# Patient Record
Sex: FEMALE | Race: OTHER | NOT HISPANIC OR LATINO | URBAN - METROPOLITAN AREA
[De-identification: names, ages, dates, MRNs, and addresses within clinical notes are randomized per-mention and may not be internally consistent; named-entity substitution may affect disease eponyms.]

---

## 2022-07-30 ENCOUNTER — INPATIENT (INPATIENT)
Facility: HOSPITAL | Age: 77
LOS: 1 days | Discharge: ANOTHER HOSPITAL | DRG: 282 | End: 2022-08-01
Attending: INTERNAL MEDICINE | Admitting: HOSPITALIST
Payer: MEDICARE

## 2022-07-30 VITALS
SYSTOLIC BLOOD PRESSURE: 196 MMHG | DIASTOLIC BLOOD PRESSURE: 134 MMHG | TEMPERATURE: 99 F | RESPIRATION RATE: 16 BRPM | OXYGEN SATURATION: 97 % | HEART RATE: 156 BPM | WEIGHT: 155.65 LBS

## 2022-07-30 DIAGNOSIS — Z98.890 OTHER SPECIFIED POSTPROCEDURAL STATES: Chronic | ICD-10-CM

## 2022-07-30 DIAGNOSIS — I48.91 UNSPECIFIED ATRIAL FIBRILLATION: ICD-10-CM

## 2022-07-30 DIAGNOSIS — Z90.49 ACQUIRED ABSENCE OF OTHER SPECIFIED PARTS OF DIGESTIVE TRACT: Chronic | ICD-10-CM

## 2022-07-30 DIAGNOSIS — Z90.710 ACQUIRED ABSENCE OF BOTH CERVIX AND UTERUS: Chronic | ICD-10-CM

## 2022-07-30 LAB
ALBUMIN SERPL ELPH-MCNC: 3.4 G/DL — SIGNIFICANT CHANGE UP (ref 3.3–5)
ALP SERPL-CCNC: 84 U/L — SIGNIFICANT CHANGE UP (ref 40–120)
ALT FLD-CCNC: 17 U/L — SIGNIFICANT CHANGE UP (ref 10–45)
ANION GAP SERPL CALC-SCNC: 8 MMOL/L — SIGNIFICANT CHANGE UP (ref 5–17)
APPEARANCE UR: CLEAR — SIGNIFICANT CHANGE UP
APTT BLD: 35.5 SEC — SIGNIFICANT CHANGE UP (ref 27.5–35.5)
APTT BLD: >200 SEC — CRITICAL HIGH (ref 27.5–35.5)
AST SERPL-CCNC: 19 U/L — SIGNIFICANT CHANGE UP (ref 10–40)
BACTERIA # UR AUTO: NEGATIVE /HPF — SIGNIFICANT CHANGE UP
BASOPHILS # BLD AUTO: 0.03 K/UL — SIGNIFICANT CHANGE UP (ref 0–0.2)
BASOPHILS NFR BLD AUTO: 0.4 % — SIGNIFICANT CHANGE UP (ref 0–2)
BILIRUB SERPL-MCNC: 0.3 MG/DL — SIGNIFICANT CHANGE UP (ref 0.2–1.2)
BILIRUB UR-MCNC: NEGATIVE — SIGNIFICANT CHANGE UP
BUN SERPL-MCNC: 22 MG/DL — SIGNIFICANT CHANGE UP (ref 7–23)
CALCIUM SERPL-MCNC: 8.7 MG/DL — SIGNIFICANT CHANGE UP (ref 8.4–10.5)
CHLORIDE SERPL-SCNC: 105 MMOL/L — SIGNIFICANT CHANGE UP (ref 96–108)
CO2 SERPL-SCNC: 25 MMOL/L — SIGNIFICANT CHANGE UP (ref 22–31)
COLOR SPEC: YELLOW — SIGNIFICANT CHANGE UP
CREAT SERPL-MCNC: 0.85 MG/DL — SIGNIFICANT CHANGE UP (ref 0.5–1.3)
DIFF PNL FLD: ABNORMAL
EGFR: 72 ML/MIN/1.73M2 — SIGNIFICANT CHANGE UP
EOSINOPHIL # BLD AUTO: 0.19 K/UL — SIGNIFICANT CHANGE UP (ref 0–0.5)
EOSINOPHIL NFR BLD AUTO: 2.2 % — SIGNIFICANT CHANGE UP (ref 0–6)
EPI CELLS # UR: ABNORMAL
GLUCOSE SERPL-MCNC: 156 MG/DL — HIGH (ref 70–99)
GLUCOSE UR QL: NEGATIVE — SIGNIFICANT CHANGE UP
HCT VFR BLD CALC: 37.5 % — SIGNIFICANT CHANGE UP (ref 34.5–45)
HCT VFR BLD CALC: 39.3 % — SIGNIFICANT CHANGE UP (ref 34.5–45)
HGB BLD-MCNC: 12.3 G/DL — SIGNIFICANT CHANGE UP (ref 11.5–15.5)
HGB BLD-MCNC: 13.1 G/DL — SIGNIFICANT CHANGE UP (ref 11.5–15.5)
IMM GRANULOCYTES NFR BLD AUTO: 0.2 % — SIGNIFICANT CHANGE UP (ref 0–1.5)
INR BLD: 0.91 RATIO — SIGNIFICANT CHANGE UP (ref 0.88–1.16)
KETONES UR-MCNC: NEGATIVE — SIGNIFICANT CHANGE UP
LEUKOCYTE ESTERASE UR-ACNC: ABNORMAL
LYMPHOCYTES # BLD AUTO: 2.69 K/UL — SIGNIFICANT CHANGE UP (ref 1–3.3)
LYMPHOCYTES # BLD AUTO: 31.8 % — SIGNIFICANT CHANGE UP (ref 13–44)
MCHC RBC-ENTMCNC: 28.5 PG — SIGNIFICANT CHANGE UP (ref 27–34)
MCHC RBC-ENTMCNC: 28.7 PG — SIGNIFICANT CHANGE UP (ref 27–34)
MCHC RBC-ENTMCNC: 32.8 GM/DL — SIGNIFICANT CHANGE UP (ref 32–36)
MCHC RBC-ENTMCNC: 33.3 GM/DL — SIGNIFICANT CHANGE UP (ref 32–36)
MCV RBC AUTO: 85.6 FL — SIGNIFICANT CHANGE UP (ref 80–100)
MCV RBC AUTO: 87.4 FL — SIGNIFICANT CHANGE UP (ref 80–100)
MONOCYTES # BLD AUTO: 0.47 K/UL — SIGNIFICANT CHANGE UP (ref 0–0.9)
MONOCYTES NFR BLD AUTO: 5.5 % — SIGNIFICANT CHANGE UP (ref 2–14)
NEUTROPHILS # BLD AUTO: 5.07 K/UL — SIGNIFICANT CHANGE UP (ref 1.8–7.4)
NEUTROPHILS NFR BLD AUTO: 59.9 % — SIGNIFICANT CHANGE UP (ref 43–77)
NITRITE UR-MCNC: NEGATIVE — SIGNIFICANT CHANGE UP
NRBC # BLD: 0 /100 WBCS — SIGNIFICANT CHANGE UP (ref 0–0)
NRBC # BLD: 0 /100 WBCS — SIGNIFICANT CHANGE UP (ref 0–0)
PH UR: 7 — SIGNIFICANT CHANGE UP (ref 5–8)
PLATELET # BLD AUTO: 172 K/UL — SIGNIFICANT CHANGE UP (ref 150–400)
PLATELET # BLD AUTO: 199 K/UL — SIGNIFICANT CHANGE UP (ref 150–400)
POTASSIUM SERPL-MCNC: 3.9 MMOL/L — SIGNIFICANT CHANGE UP (ref 3.5–5.3)
POTASSIUM SERPL-SCNC: 3.9 MMOL/L — SIGNIFICANT CHANGE UP (ref 3.5–5.3)
PROT SERPL-MCNC: 7.4 G/DL — SIGNIFICANT CHANGE UP (ref 6–8.3)
PROT UR-MCNC: NEGATIVE — SIGNIFICANT CHANGE UP
PROTHROM AB SERPL-ACNC: 10.5 SEC — SIGNIFICANT CHANGE UP (ref 10.5–13.4)
RBC # BLD: 4.29 M/UL — SIGNIFICANT CHANGE UP (ref 3.8–5.2)
RBC # BLD: 4.59 M/UL — SIGNIFICANT CHANGE UP (ref 3.8–5.2)
RBC # FLD: 12.6 % — SIGNIFICANT CHANGE UP (ref 10.3–14.5)
RBC # FLD: 12.9 % — SIGNIFICANT CHANGE UP (ref 10.3–14.5)
RBC CASTS # UR COMP ASSIST: SIGNIFICANT CHANGE UP /HPF (ref 0–4)
SARS-COV-2 RNA SPEC QL NAA+PROBE: SIGNIFICANT CHANGE UP
SODIUM SERPL-SCNC: 138 MMOL/L — SIGNIFICANT CHANGE UP (ref 135–145)
SP GR SPEC: 1.01 — SIGNIFICANT CHANGE UP (ref 1.01–1.02)
TROPONIN I, HIGH SENSITIVITY RESULT: 428.2 NG/L — HIGH
TROPONIN I, HIGH SENSITIVITY RESULT: 529.7 NG/L — HIGH
TROPONIN I, HIGH SENSITIVITY RESULT: 68.3 NG/L — HIGH
TSH SERPL-MCNC: 0.16 UIU/ML — LOW (ref 0.36–3.74)
UROBILINOGEN FLD QL: NEGATIVE — SIGNIFICANT CHANGE UP
WBC # BLD: 7.09 K/UL — SIGNIFICANT CHANGE UP (ref 3.8–10.5)
WBC # BLD: 8.47 K/UL — SIGNIFICANT CHANGE UP (ref 3.8–10.5)
WBC # FLD AUTO: 7.09 K/UL — SIGNIFICANT CHANGE UP (ref 3.8–10.5)
WBC # FLD AUTO: 8.47 K/UL — SIGNIFICANT CHANGE UP (ref 3.8–10.5)
WBC UR QL: SIGNIFICANT CHANGE UP /HPF (ref 0–5)

## 2022-07-30 PROCEDURE — 93306 TTE W/DOPPLER COMPLETE: CPT | Mod: 26

## 2022-07-30 PROCEDURE — 99223 1ST HOSP IP/OBS HIGH 75: CPT

## 2022-07-30 PROCEDURE — 93010 ELECTROCARDIOGRAM REPORT: CPT | Mod: 76

## 2022-07-30 PROCEDURE — 71045 X-RAY EXAM CHEST 1 VIEW: CPT | Mod: 26

## 2022-07-30 PROCEDURE — 99285 EMERGENCY DEPT VISIT HI MDM: CPT | Mod: FS

## 2022-07-30 RX ORDER — FOLIC ACID 0.8 MG
1 TABLET ORAL DAILY
Refills: 0 | Status: DISCONTINUED | OUTPATIENT
Start: 2022-07-30 | End: 2022-08-01

## 2022-07-30 RX ORDER — HEPARIN SODIUM 5000 [USP'U]/ML
6000 INJECTION INTRAVENOUS; SUBCUTANEOUS ONCE
Refills: 0 | Status: COMPLETED | OUTPATIENT
Start: 2022-07-30 | End: 2022-07-30

## 2022-07-30 RX ORDER — DILTIAZEM HCL 120 MG
30 CAPSULE, EXT RELEASE 24 HR ORAL EVERY 6 HOURS
Refills: 0 | Status: DISCONTINUED | OUTPATIENT
Start: 2022-07-30 | End: 2022-08-01

## 2022-07-30 RX ORDER — ATORVASTATIN CALCIUM 80 MG/1
40 TABLET, FILM COATED ORAL AT BEDTIME
Refills: 0 | Status: DISCONTINUED | OUTPATIENT
Start: 2022-07-30 | End: 2022-08-01

## 2022-07-30 RX ORDER — HEPARIN SODIUM 5000 [USP'U]/ML
3000 INJECTION INTRAVENOUS; SUBCUTANEOUS EVERY 6 HOURS
Refills: 0 | Status: DISCONTINUED | OUTPATIENT
Start: 2022-07-30 | End: 2022-07-30

## 2022-07-30 RX ORDER — DILTIAZEM HCL 120 MG
30 CAPSULE, EXT RELEASE 24 HR ORAL ONCE
Refills: 0 | Status: COMPLETED | OUTPATIENT
Start: 2022-07-30 | End: 2022-07-30

## 2022-07-30 RX ORDER — ENOXAPARIN SODIUM 100 MG/ML
70 INJECTION SUBCUTANEOUS EVERY 12 HOURS
Refills: 0 | Status: DISCONTINUED | OUTPATIENT
Start: 2022-07-30 | End: 2022-07-31

## 2022-07-30 RX ORDER — LEVOTHYROXINE SODIUM 125 MCG
112 TABLET ORAL DAILY
Refills: 0 | Status: DISCONTINUED | OUTPATIENT
Start: 2022-07-31 | End: 2022-07-31

## 2022-07-30 RX ORDER — APIXABAN 2.5 MG/1
5 TABLET, FILM COATED ORAL ONCE
Refills: 0 | Status: DISCONTINUED | OUTPATIENT
Start: 2022-07-30 | End: 2022-07-30

## 2022-07-30 RX ORDER — ASPIRIN/CALCIUM CARB/MAGNESIUM 324 MG
81 TABLET ORAL DAILY
Refills: 0 | Status: DISCONTINUED | OUTPATIENT
Start: 2022-07-30 | End: 2022-08-01

## 2022-07-30 RX ORDER — ALBUTEROL 90 UG/1
2 AEROSOL, METERED ORAL EVERY 6 HOURS
Refills: 0 | Status: DISCONTINUED | OUTPATIENT
Start: 2022-07-30 | End: 2022-08-01

## 2022-07-30 RX ORDER — LATANOPROST 0.05 MG/ML
1 SOLUTION/ DROPS OPHTHALMIC; TOPICAL AT BEDTIME
Refills: 0 | Status: DISCONTINUED | OUTPATIENT
Start: 2022-07-30 | End: 2022-08-01

## 2022-07-30 RX ORDER — LOSARTAN POTASSIUM 100 MG/1
50 TABLET, FILM COATED ORAL DAILY
Refills: 0 | Status: DISCONTINUED | OUTPATIENT
Start: 2022-07-31 | End: 2022-07-31

## 2022-07-30 RX ORDER — ACETAMINOPHEN 500 MG
650 TABLET ORAL EVERY 6 HOURS
Refills: 0 | Status: DISCONTINUED | OUTPATIENT
Start: 2022-07-30 | End: 2022-08-01

## 2022-07-30 RX ORDER — HEPARIN SODIUM 5000 [USP'U]/ML
INJECTION INTRAVENOUS; SUBCUTANEOUS
Qty: 25000 | Refills: 0 | Status: DISCONTINUED | OUTPATIENT
Start: 2022-07-30 | End: 2022-07-30

## 2022-07-30 RX ORDER — LOSARTAN POTASSIUM 100 MG/1
100 TABLET, FILM COATED ORAL ONCE
Refills: 0 | Status: COMPLETED | OUTPATIENT
Start: 2022-07-30 | End: 2022-07-30

## 2022-07-30 RX ORDER — LANOLIN ALCOHOL/MO/W.PET/CERES
3 CREAM (GRAM) TOPICAL AT BEDTIME
Refills: 0 | Status: DISCONTINUED | OUTPATIENT
Start: 2022-07-30 | End: 2022-08-01

## 2022-07-30 RX ORDER — HEPARIN SODIUM 5000 [USP'U]/ML
6000 INJECTION INTRAVENOUS; SUBCUTANEOUS EVERY 6 HOURS
Refills: 0 | Status: DISCONTINUED | OUTPATIENT
Start: 2022-07-30 | End: 2022-07-30

## 2022-07-30 RX ORDER — DILTIAZEM HCL 120 MG
10 CAPSULE, EXT RELEASE 24 HR ORAL ONCE
Refills: 0 | Status: COMPLETED | OUTPATIENT
Start: 2022-07-30 | End: 2022-07-30

## 2022-07-30 RX ADMIN — ENOXAPARIN SODIUM 70 MILLIGRAM(S): 100 INJECTION SUBCUTANEOUS at 20:24

## 2022-07-30 RX ADMIN — HEPARIN SODIUM 6000 UNIT(S): 5000 INJECTION INTRAVENOUS; SUBCUTANEOUS at 12:51

## 2022-07-30 RX ADMIN — HEPARIN SODIUM 1300 UNIT(S)/HR: 5000 INJECTION INTRAVENOUS; SUBCUTANEOUS at 13:06

## 2022-07-30 RX ADMIN — ATORVASTATIN CALCIUM 40 MILLIGRAM(S): 80 TABLET, FILM COATED ORAL at 21:35

## 2022-07-30 RX ADMIN — Medication 10 MILLIGRAM(S): at 09:59

## 2022-07-30 RX ADMIN — Medication 650 MILLIGRAM(S): at 13:13

## 2022-07-30 RX ADMIN — Medication 30 MILLIGRAM(S): at 10:10

## 2022-07-30 RX ADMIN — LATANOPROST 1 DROP(S): 0.05 SOLUTION/ DROPS OPHTHALMIC; TOPICAL at 21:35

## 2022-07-30 RX ADMIN — Medication 30 MILLIGRAM(S): at 23:41

## 2022-07-30 RX ADMIN — Medication 650 MILLIGRAM(S): at 14:04

## 2022-07-30 RX ADMIN — Medication 81 MILLIGRAM(S): at 14:31

## 2022-07-30 NOTE — H&P ADULT - CONVERSATION DETAILS
Family has not had GOC conversation about CPR, intubation, PEG tube etc. They would like sometime to discuss  amongst themselves what would be best for the patient. Needs F/U

## 2022-07-30 NOTE — ED PROVIDER NOTE - NS ED ATTENDING STATEMENT MOD
Attending Only This was a shared visit with the KHANG. I reviewed and verified the documentation and independently performed the documented:

## 2022-07-30 NOTE — H&P ADULT - HISTORY OF PRESENT ILLNESS
75 yo woman with history of Hypertension, Right Carotid Artery Stenosis s/p stent, Anemia, Asthma, Hypothyroidism, and Glaucoma comes to ED c/o chest pain. Patient said she went back to sleep around 5am when she suddenly woke up with left sided chest pain 9/10. Patient states its difficult to describe. States it was sharp but also pressure like. She has never had pain like this before. She says her left arm feels slightly numb but now at baseline. SHe reports feeling the left ear as warm and a left side headache. She denies chest pain currently, sob, palpitations, nausea, vomiting, fever, or chills    While in the ED, HR was elevated and noted to be in a. fib on EKG. Patient has no reported history of a. fib as per son and daughter at bedside. 75 yo woman with history of Hypertension, Right Carotid Artery Stenosis s/p stent, Asthma, Hypothyroidism, and Glaucoma comes to ED c/o chest pain. Patient said she went back to sleep around 5am when she suddenly woke up with left sided chest pain 9/10. Patient states its difficult to describe. States it was sharp but also pressure like. She has never had pain like this before. She says her left arm feels slightly numb but now at baseline. SHe reports feeling the left ear as warm and a left side headache. She denies chest pain currently, sob, palpitations, nausea, vomiting, fever, or chills    While in the ED, HR was elevated and noted to be in a. fib on EKG. Patient has no reported history of a. fib as per son and daughter at bedside.

## 2022-07-30 NOTE — PROGRESS NOTE ADULT - ASSESSMENT
PLAN:    -now rate controlled with PO cardizem (per note review hx of reactive airway with Beta blockers)  -on full dose lovenox now, was on heparin gtt PTT >200. heparin gtt now off, can repeat PTT in am  -trend trop has had two sent already 68 --> 428, [facility] continues to [facility]   -Hx of thyroid disease ordered thyroid panel, would consider holding further synthroif until thyroid panel returns as elevated T3/4 could contribute to tachycardia on presentation   -on ASA as well  -DVT prophylaxis covered with full dose lovenox  -AM labs

## 2022-07-30 NOTE — ED PROVIDER NOTE - ATTENDING APP SHARED VISIT CONTRIBUTION OF CARE
I, Olga Aguiar MD, performed the initial face to face bedside interview with this patient regarding history of present illness, review of symptoms and relevant past medical, social and family history.  I completed an independent physical examination.  I was the initial provider who evaluated this patient. I have signed out the follow up of any pending tests (i.e. labs, radiological studies) to the ACP.  I have communicated the patient’s plan of care and disposition with the ACP.  The history, relevant review of systems, past medical and surgical history, medical decision making, and physical examination was documented by the scribe in my presence and I attest to the accuracy of the documentation.

## 2022-07-30 NOTE — ED ADULT NURSE NOTE - OBJECTIVE STATEMENT
Assumed pt care for a a 76 yr old female alert and oriented x4, coming from home complaining of chest pain and palpitations. Assumed pt care for a a 76 yr old female alert and oriented x4, coming from home complaining of chest pain and palpitations. Pt reports she woke up this morning with the pain and since then has not gone away. Pt describes pain as a discomfort, and feels like if her heart is racing. Pt denies any dizziness, nausea, dyspnea, and weakness. Heart rate of 150-170, A-fib on cardiac monitor. IV established. meds administered as per order. Pt HR resolved. ECG series complete. Awaiting further disposition.

## 2022-07-30 NOTE — H&P ADULT - NSICDXPASTSURGICALHX_GEN_ALL_CORE_FT
PAST SURGICAL HISTORY:  H/O: hysterectomy     History of appendectomy     S/P rotator cuff surgery

## 2022-07-30 NOTE — H&P ADULT - NSHPLABSRESULTS_GEN_ALL_CORE
LABS:                        13.1   8.47  )-----------( 199      ( 30 Jul 2022 09:50 )             39.3     07-30    138  |  105  |  22  ----------------------------<  156<H>  3.9   |  25  |  0.85    Ca    8.7      30 Jul 2022 09:50    TPro  7.4  /  Alb  3.4  /  TBili  0.3  /  DBili  x   /  AST  19  /  ALT  17  /  AlkPhos  84  07-30    PT/INR - ( 30 Jul 2022 09:50 )   PT: 10.5 sec;   INR: 0.91 ratio         PTT - ( 30 Jul 2022 09:50 )  PTT:35.5 sec    RADIOLOGY & ADDITIONAL TESTS:  CXR (7/30/22) No infiltrate or consolidation noted. F/U official report    EKG (7/30/22) a. fib (80) qtc 412    Consultant(s) Notes Reviewed:  [x ] YES  [ ] NO  Care Discussed with Consultants/Other Providers [ x] YES  [ ] NO  Imaging Personally Reviewed:  [x ] YES  [ ] NO

## 2022-07-30 NOTE — ED PROVIDER NOTE - PHYSICAL EXAMINATION
General:     NAD, well-nourished, well-appearing  Eyes: PERRL  Head:     NC/AT, EOMI, oral mucosa moist  Neck:     trachea midline  Lungs:     CTA b/l  CVS:     tachycardia  Abd:     +BS, s/nt/nd  Ext:   no deformities   Neuro: AAOx3

## 2022-07-30 NOTE — ED PROVIDER NOTE - OBJECTIVE STATEMENT
pt 77 yo f c/o non radiating chest pain since this AM that has been constant. pt covid positive 1 month ago and recent travel on a plane >5 hrs  denies sob, n/v, dizziness, headache, fever, chills. didn't take any meds for pain

## 2022-07-30 NOTE — CONSULT NOTE ADULT - ASSESSMENT
imp    NEW ONSET AF WITH CP AND PALPITATIONS  ELEVATED TROPONIN COULD BE DUE TO DEMAND ISCHEMIA GIVEN HER HISTORY OF MINIMAL IF ANY CAD    SUGGEST  TREATMENT DOSE ENOXAPARIN FOR TODAY  FOLLOW UP TROPONIN AND EKG  COULD USE EITHER BBLOCKER OR DILTIAZEM FOR RATE CONTROL  HER PMD SUGGEST CAUTION WITH BBLOCKERS BECAUSE HE STATES SHE SOEMTIMES HAS ISSUES FROM HER REACTIVE AIRWAYS DISEASE. IF ECHO IS NORMAL DILTIAZEM MAY BE PREFERABLE  FURTHER RECOMMENDATIONS DEPENDING ON CLINICAL COURSE

## 2022-07-30 NOTE — CONSULT NOTE ADULT - SUBJECTIVE AND OBJECTIVE BOX
Chief Complaint: chest pain and palpitations    HPI: 76 yr old woman with hx of htn and hld reactive airways disease carotid stent and nl cath 8 yrs ago with minimal dz on more recent cva according to her doctor presents with several hours of left upper chest pain and palpitations    PMH:   Hypothyroidism    HLD (hyperlipidemia)    HTN, age 0-18      PSH:     Family History:  FAMILY HISTORY:non contributory      Social History:  Smoking:denies  Alcohol:denies  Drugs:denies    Allergies:  No Known Allergies      Medications:  irbesartan 300  atorvastatin 10  pro air  heparin   Injectable 6000 Unit(s) IV Push once  heparin   Injectable 6000 Unit(s) IV Push every 6 hours PRN  heparin   Injectable 3000 Unit(s) IV Push every 6 hours PRN  heparin  Infusion.  Unit(s)/Hr IV Continuous <Continuous>      REVIEW OF SYSTEMS:  CONSTITUTIONAL: No fever, weight loss, or fatigue  EYES: No eye pain, visual disturbances, or discharge  ENMT:  No difficulty hearing, tinnitus, vertigo; No sinus or throat pain  NECK: No pain or stiffness  BREASTS: No pain, masses, or nipple discharge  RESPIRATORY: No cough, wheezing, chills or hemoptysis; No shortness of breath  CARDIOVASCULAR: No chest pain, palpitations, dizziness, or leg swelling  GASTROINTESTINAL: No abdominal or epigastric pain. No nausea, vomiting, or hematemesis; No diarrhea or constipation. No melena or hematochezia.  GENITOURINARY: No dysuria, frequency, hematuria, or incontinence  NEUROLOGICAL: No headaches, memory loss, loss of strength, numbness, or tremors  SKIN: No itching, burning, rashes, or lesions   LYMPH NODES: No enlarged glands  ENDOCRINE: No heat or cold intolerance; No hair loss  MUSCULOSKELETAL: No joint pain or swelling; No muscle, back, or extremity pain  PSYCHIATRIC: No depression, anxiety, mood swings, or difficulty sleeping  HEME/LYMPH: No easy bruising, or bleeding gums  ALLERY AND IMMUNOLOGIC: No hives or eczema    Physical Exam:  T(C): 36.8 (07-30-22 @ 12:23), Max: 37.1 (07-30-22 @ 09:37)  HR: 89 (07-30-22 @ 12:23) (89 - 156)  BP: 177/106 (07-30-22 @ 12:23) (106/89 - 196/134)  RR: 16 (07-30-22 @ 12:23) (16 - 18)  SpO2: 97% (07-30-22 @ 12:23) (96% - 98%)  Wt(kg): --    GENERAL: NAD, well-groomed, well-developed  HEAD:  Atraumatic, Normocephalic  EYES: EOMI, conjunctiva and sclera clear  ENT: Moist mucous membranes,  NECK: Supple, No JVD, no bruits  CHEST/LUNG: Clear to percussion bilaterally; No rales, rhonchi, wheezing, or rubs  HEART: irregularly irregular  ABDOMEN: Soft, Nontender, Nondistended; Bowel sounds present  EXTREMITIES:  2+ Peripheral Pulses, No clubbing, cyanosis, or edema  SKIN: No rashes or lesions  NERVOUS SYSTEM:  Alert & Oriented X3, Good concentration; Motor Strength 5/5 B/L upper and lower extremities; DTRs 2+ intact and symmetric    Cardiovascular Diagnostic Testing:  ECG: af with rapid resoponse non specific ST abn    Labs:                        13.1   8.47  )-----------( 199      ( 30 Jul 2022 09:50 )             39.3     07-30    138  |  105  |  22  ----------------------------<  156<H>  3.9   |  25  |  0.85    Ca    8.7      30 Jul 2022 09:50    TPro  7.4  /  Alb  3.4  /  TBili  0.3  /  DBili  x   /  AST  19  /  ALT  17  /  AlkPhos  84  07-30    PT/INR - ( 30 Jul 2022 09:50 )   PT: 10.5 sec;   INR: 0.91 ratio         PTT - ( 30 Jul 2022 09:50 )  PTT:35.5 sec                Imaging:

## 2022-07-30 NOTE — ED PROVIDER NOTE - CLINICAL SUMMARY MEDICAL DECISION MAKING FREE TEXT BOX
pt 75 yo f c/o non radiating chest pain since this AM that has been constant. pt covid positive 1 month ago and recent travel on a plane >5 hrs  denies sob, n/v, dizziness, headache, fever, chills. didn't take any meds for pain  found to be in rapid afib. responded to cardizem

## 2022-07-30 NOTE — H&P ADULT - NSHPSOURCEINFORD_GEN_ALL_CORE
Care Everywhere: updated  Immunization: updated  Health Maintenance: updated  Media Review:   Legacy Review:   DIS:  Order placed:   Upcoming appts:  EFAX:  Task Tickets:  Referrals:       Patient

## 2022-07-30 NOTE — H&P ADULT - NSHPREVIEWOFSYSTEMS_GEN_ALL_CORE
CONSTITUTIONAL: No fever, weight loss, or fatigue  EYES: No eye pain, visual disturbances, or discharge  ENMT:  No difficulty hearing, tinnitus, vertigo; No sinus or throat pain  NECK: No pain or stiffness  RESPIRATORY: No cough, wheezing, chills or hemoptysis; No shortness of breath  CARDIOVASCULAR: +chest pain; No palpitations, dizziness, or leg swelling  GASTROINTESTINAL: No abdominal or epigastric pain. No nausea, vomiting, or hematemesis; No diarrhea or constipation. No melena or hematochezia.  GENITOURINARY: No dysuria, frequency, hematuria, or incontinence  NEUROLOGICAL: +headaches; No memory loss, loss of strength, numbness, or tremors  SKIN: No itching, burning, rashes, or lesions   MUSCULOSKELETAL: No joint pain or swelling; No muscle, back, or extremity pain  PSYCHIATRIC: No depression, anxiety, mood swings, or difficulty sleeping  ALLERGY AND IMMUNOLOGIC: No hives or eczema    ALL ROS REVIEWED AND NORMAL EXCEPT AS STATED ABOVE

## 2022-07-30 NOTE — H&P ADULT - ASSESSMENT
75 yo woman with history of Hypertension, Right Carotid Artery Stenosis s/p stent, Anemia, Asthma, Hypothyroidism, and Glaucoma comes to ED c/o chest pain. Patient said she went back to sleep around 5am when       Right Carotid Artery Stenosis    #Asthma  Stable  Continue Albuterol PRN    atorvastatin:  (30 Jul 2022 11:40)  Delroy Aspirin:  (30 Jul 2022 11:40)  folic acid:  (30 Jul 2022 11:40)  irbesartan:  (30 Jul 2022 11:40)  latanoprost 0.005% ophthalmic solution: 1 drop(s) to each affected eye once a day (in the evening) (30 Jul 2022 13:42)  Synthroid:  (30 Jul 2022 11:40)   75 yo woman with history of Hypertension, Right Carotid Artery Stenosis s/p stent, Asthma, Hypothyroidism, and Glaucoma comes to ED c/o chest pain.     #New Onset Atrial Fibrillation in RVR  Currently HR < 100 after receiving IV and PO Cardizem in ED  Continue Telemetry Monitoring  Continue cardizem 30mg PO q 6hrs for now  Starting lovenox at therapeutic dose  CHADS2 score: 4  Continue Aspirin 81mg daily  Serial cardiac enzyme with EKG. EKG for repeat in AM  F/U TSH and CXR  F/U TTE  Cardiology evaluation noted    #Hypertension  Continue losartan at reduced dose since starting Cardizem  Suggest upon D/C to decrease irbesartan hoe dose by 1/2    #Right Carotid Artery Stenosis s/p stent  Continue Asa and statin (dose increased)  F/U Lipid panel    #Asthma  Stable  Continue Albuterol PRN    #Hypothyroidism  Continue synthroid  F/u TSH    #Glaucoma  Continue latanoprost     #DVT Prophylaxis - on lovenox    HCP Jason at bedside and aware of plan. [phone]      GOC: Family to have conversation about GOC. Please follow up. Full Code for now   75 yo woman with history of Hypertension, Right Carotid Artery Stenosis s/p stent, Asthma, Hypothyroidism, and Glaucoma comes to ED c/o chest pain.     #New Onset Atrial Fibrillation in RVR  #Elevated troponins  Currently HR < 100 after receiving IV and PO Cardizem in ED  Continue Telemetry Monitoring  Continue cardizem 30mg PO q 6hrs for now  Starting lovenox at therapeutic dose  CHADS2 score: 4  Continue Aspirin 81mg daily  Serial cardiac enzyme with EKG. EKG for repeat in AM  F/U TSH and CXR  F/U TTE  Cardiology evaluation noted    #Hypertension  Continue losartan at reduced dose since starting Cardizem  Suggest upon D/C to decrease irbesartan hoe dose by 1/2    #Right Carotid Artery Stenosis s/p stent  Continue Asa and statin (dose increased)  F/U Lipid panel    #Asthma  Stable  Continue Albuterol PRN    #Hypothyroidism  Continue synthroid  F/u TSH    #Glaucoma  Continue latanoprost     #DVT Prophylaxis - on lovenox    HCP Jason at bedside and aware of plan. [phone]      GOC: Family to have conversation about GOC. Please follow up. Full Code for now

## 2022-07-30 NOTE — PROGRESS NOTE ADULT - SUBJECTIVE AND OBJECTIVE BOX
F/U Note:    76y Female admitted with chest pain/pressure, NSTEMI in setting of rapid afib    Interval Hx:  -heparin gtt changed to full dose lovenox  -high sensitivity trop 68 on admit recent up to  428  -no current chest pain    Vital Signs Last 24 Hrs  T(C): 36.7 (30 Jul 2022 17:47), Max: 37.1 (30 Jul 2022 09:37)  T(F): 98 (30 Jul 2022 17:47), Max: 98.7 (30 Jul 2022 09:37)  HR: 58 (30 Jul 2022 17:47) (58 - 156)  BP: 154/67 (30 Jul 2022 17:47) (106/89 - 196/134)  BP(mean): 121 (30 Jul 2022 12:23) (121 - 121)  RR: 19 (30 Jul 2022 17:47) (16 - 19)  SpO2: 100% (30 Jul 2022 17:47) (96% - 100%)    Parameters below as of 30 Jul 2022 17:47  Patient On (Oxygen Delivery Method): room air                                12.3   7.09  )-----------( 172      ( 30 Jul 2022 19:10 )             37.5         07-30    138  |  105  |  22  ----------------------------<  156<H>  3.9   |  25  |  0.85    Ca    8.7      30 Jul 2022 09:50    TPro  7.4  /  Alb  3.4  /  TBili  0.3  /  DBili  x   /  AST  19  /  ALT  17  /  AlkPhos  84  07-30        NEURO: no headaches, blurry vision, tremors, depression, anxity  CV: no chest pain, palpitations, murmurs, orthopnea  Resp: no shortness of breath, cough, wheeze, sputum production  GI: no stomach pain,nausea, vomitting, flatulence, hematemesis, hematochezia  PV: no swelling of extremities, no hair loss, no coolness to extremities  ENDO: no polydypsia, polyphagia, polyuria, weight loss, night sweats         NEURO: awake and alert  CV: (+) S1/S2, irregularly irregular, no MRG   RESP: CTA b/l  GI: soft, non tender

## 2022-07-30 NOTE — ED ADULT NURSE NOTE - NS ED NURSE LEVEL OF CONSCIOUSNESS ORIENTATION
Oriented - self; Oriented - place; Oriented - time
I will STOP taking the medications listed below when I get home from the hospital:  None

## 2022-07-30 NOTE — PATIENT PROFILE ADULT - FALL HARM RISK - UNIVERSAL INTERVENTIONS
Bed in lowest position, wheels locked, appropriate side rails in place/Call bell, personal items and telephone in reach/Instruct patient to call for assistance before getting out of bed or chair/Non-slip footwear when patient is out of bed/[place] to call system/Physically safe environment - no spills, clutter or unnecessary equipment/Purposeful Proactive Rounding/Room/bathroom lighting operational, light cord in reach

## 2022-07-30 NOTE — ED PROVIDER NOTE - NS ED MD TWO NIGHTS YN
Recent annual and pap in 10/2019. Deferred today as no complaints. Denies pain or bleeding. Sure LMP. Discussed u/s with 6 day difference as sure LMP with regular cycles will keep LMP FRANCISCO J. Declines 1st tri screen. Labs reviewed. Warning signs reviewed.  Wor Yes

## 2022-07-30 NOTE — H&P ADULT - NSHPPHYSICALEXAM_GEN_ALL_CORE
T(C): 36.8 (07-30-22 @ 12:23), Max: 37.1 (07-30-22 @ 09:37)  HR: 89 (07-30-22 @ 12:23) (89 - 156)  BP: 177/106 (07-30-22 @ 12:23) (106/89 - 196/134)  RR: 16 (07-30-22 @ 12:23) (16 - 18)  SpO2: 97% (07-30-22 @ 12:23) (96% - 98%)  Wt(kg): --Vital Signs Last 24 Hrs  T(C): 36.8 (30 Jul 2022 12:23), Max: 37.1 (30 Jul 2022 09:37)  T(F): 98.2 (30 Jul 2022 12:23), Max: 98.7 (30 Jul 2022 09:37)  HR: 89 (30 Jul 2022 12:23) (89 - 156)  BP: 177/106 (30 Jul 2022 12:23) (106/89 - 196/134)  BP(mean): 121 (30 Jul 2022 12:23) (121 - 121)  RR: 16 (30 Jul 2022 12:23) (16 - 18)  SpO2: 97% (30 Jul 2022 12:23) (96% - 98%)    Parameters below as of 30 Jul 2022 12:23  Patient On (Oxygen Delivery Method): room air        PHYSICAL EXAM:  GENERAL: NAD  HENT:  Atraumatic, Normocephalic; No tonsillar erythema, exudates, or enlargement; Moist mucous membranes;   EYES: EOMI, PERRLA, conjunctiva and sclera clear, no lid-lag  NECK: Supple, No JVD, Normal thyroid  NERVOUS SYSTEM:  CN II - XII intact; Sensation intact; Motor Strength 5/5 B/L upper and lower extremities  CHEST/LUNG: Clear to percussion bilaterally; No rales, rhonchi, wheezing, or rubs; normal respiratory effort, no intercostal retractions; No pitting edema  HEART: Irregular rate and rhythm; No murmurs, rubs, or gallops  ABDOMEN: Soft, Nontender, Nondistended; Bowel sounds present; No HSM  MUSCULOSKELETAL/EXTREMITIES:  2+ Peripheral Pulses, No clubbing, or digital cyanosis  SKIN: No rashes or lesions; normal texture and temperature  PSYCH: Appropriate affect, Alert & Oriented x 3

## 2022-07-31 LAB
ALBUMIN SERPL ELPH-MCNC: 3 G/DL — LOW (ref 3.3–5)
ALP SERPL-CCNC: 78 U/L — SIGNIFICANT CHANGE UP (ref 40–120)
ALT FLD-CCNC: 18 U/L — SIGNIFICANT CHANGE UP (ref 10–45)
ANION GAP SERPL CALC-SCNC: 8 MMOL/L — SIGNIFICANT CHANGE UP (ref 5–17)
AST SERPL-CCNC: 20 U/L — SIGNIFICANT CHANGE UP (ref 10–40)
BILIRUB SERPL-MCNC: 0.5 MG/DL — SIGNIFICANT CHANGE UP (ref 0.2–1.2)
BUN SERPL-MCNC: 14 MG/DL — SIGNIFICANT CHANGE UP (ref 7–23)
CALCIUM SERPL-MCNC: 8.9 MG/DL — SIGNIFICANT CHANGE UP (ref 8.4–10.5)
CHLORIDE SERPL-SCNC: 107 MMOL/L — SIGNIFICANT CHANGE UP (ref 96–108)
CHOLEST SERPL-MCNC: 132 MG/DL — SIGNIFICANT CHANGE UP
CO2 SERPL-SCNC: 25 MMOL/L — SIGNIFICANT CHANGE UP (ref 22–31)
CREAT SERPL-MCNC: 0.79 MG/DL — SIGNIFICANT CHANGE UP (ref 0.5–1.3)
EGFR: 79 ML/MIN/1.73M2 — SIGNIFICANT CHANGE UP
GLUCOSE SERPL-MCNC: 127 MG/DL — HIGH (ref 70–99)
HCT VFR BLD CALC: 37 % — SIGNIFICANT CHANGE UP (ref 34.5–45)
HCV AB S/CO SERPL IA: 0.17 S/CO — SIGNIFICANT CHANGE UP (ref 0–0.99)
HCV AB SERPL-IMP: SIGNIFICANT CHANGE UP
HDLC SERPL-MCNC: 53 MG/DL — SIGNIFICANT CHANGE UP
HGB BLD-MCNC: 12.3 G/DL — SIGNIFICANT CHANGE UP (ref 11.5–15.5)
LIPID PNL WITH DIRECT LDL SERPL: 46 MG/DL — SIGNIFICANT CHANGE UP
MAGNESIUM SERPL-MCNC: 2.1 MG/DL — SIGNIFICANT CHANGE UP (ref 1.6–2.6)
MCHC RBC-ENTMCNC: 28.5 PG — SIGNIFICANT CHANGE UP (ref 27–34)
MCHC RBC-ENTMCNC: 33.2 GM/DL — SIGNIFICANT CHANGE UP (ref 32–36)
MCV RBC AUTO: 85.6 FL — SIGNIFICANT CHANGE UP (ref 80–100)
NON HDL CHOLESTEROL: 79 MG/DL — SIGNIFICANT CHANGE UP
NRBC # BLD: 0 /100 WBCS — SIGNIFICANT CHANGE UP (ref 0–0)
PHOSPHATE SERPL-MCNC: 3 MG/DL — SIGNIFICANT CHANGE UP (ref 2.5–4.5)
PLATELET # BLD AUTO: 171 K/UL — SIGNIFICANT CHANGE UP (ref 150–400)
POTASSIUM SERPL-MCNC: 4 MMOL/L — SIGNIFICANT CHANGE UP (ref 3.5–5.3)
POTASSIUM SERPL-SCNC: 4 MMOL/L — SIGNIFICANT CHANGE UP (ref 3.5–5.3)
PROT SERPL-MCNC: 6.7 G/DL — SIGNIFICANT CHANGE UP (ref 6–8.3)
RBC # BLD: 4.32 M/UL — SIGNIFICANT CHANGE UP (ref 3.8–5.2)
RBC # FLD: 12.8 % — SIGNIFICANT CHANGE UP (ref 10.3–14.5)
SODIUM SERPL-SCNC: 140 MMOL/L — SIGNIFICANT CHANGE UP (ref 135–145)
T3 SERPL-MCNC: 121 NG/DL — SIGNIFICANT CHANGE UP (ref 80–200)
T4 AB SER-ACNC: 11.9 UG/DL — SIGNIFICANT CHANGE UP (ref 4.6–12)
TRIGL SERPL-MCNC: 165 MG/DL — HIGH
TROPONIN I, HIGH SENSITIVITY RESULT: 250.5 NG/L — HIGH
TSH SERPL-MCNC: 0.12 UIU/ML — LOW (ref 0.36–3.74)
WBC # BLD: 5.69 K/UL — SIGNIFICANT CHANGE UP (ref 3.8–10.5)
WBC # FLD AUTO: 5.69 K/UL — SIGNIFICANT CHANGE UP (ref 3.8–10.5)

## 2022-07-31 PROCEDURE — 99233 SBSQ HOSP IP/OBS HIGH 50: CPT

## 2022-07-31 RX ORDER — LEVOTHYROXINE SODIUM 125 MCG
88 TABLET ORAL DAILY
Refills: 0 | Status: DISCONTINUED | OUTPATIENT
Start: 2022-08-01 | End: 2022-08-01

## 2022-07-31 RX ORDER — REGADENOSON 0.08 MG/ML
0.4 INJECTION, SOLUTION INTRAVENOUS ONCE
Refills: 0 | Status: DISCONTINUED | OUTPATIENT
Start: 2022-07-31 | End: 2022-07-31

## 2022-07-31 RX ORDER — LOSARTAN POTASSIUM 100 MG/1
50 TABLET, FILM COATED ORAL ONCE
Refills: 0 | Status: DISCONTINUED | OUTPATIENT
Start: 2022-07-31 | End: 2022-07-31

## 2022-07-31 RX ORDER — ENOXAPARIN SODIUM 100 MG/ML
70 INJECTION SUBCUTANEOUS EVERY 12 HOURS
Refills: 0 | Status: COMPLETED | OUTPATIENT
Start: 2022-07-31 | End: 2022-07-31

## 2022-07-31 RX ORDER — LOSARTAN POTASSIUM 100 MG/1
100 TABLET, FILM COATED ORAL DAILY
Refills: 0 | Status: DISCONTINUED | OUTPATIENT
Start: 2022-08-01 | End: 2022-08-01

## 2022-07-31 RX ADMIN — Medication 30 MILLIGRAM(S): at 23:01

## 2022-07-31 RX ADMIN — ENOXAPARIN SODIUM 70 MILLIGRAM(S): 100 INJECTION SUBCUTANEOUS at 21:20

## 2022-07-31 RX ADMIN — Medication 30 MILLIGRAM(S): at 12:24

## 2022-07-31 RX ADMIN — LOSARTAN POTASSIUM 50 MILLIGRAM(S): 100 TABLET, FILM COATED ORAL at 05:55

## 2022-07-31 RX ADMIN — ATORVASTATIN CALCIUM 40 MILLIGRAM(S): 80 TABLET, FILM COATED ORAL at 21:21

## 2022-07-31 RX ADMIN — Medication 112 MICROGRAM(S): at 05:55

## 2022-07-31 RX ADMIN — Medication 81 MILLIGRAM(S): at 12:50

## 2022-07-31 RX ADMIN — ENOXAPARIN SODIUM 70 MILLIGRAM(S): 100 INJECTION SUBCUTANEOUS at 09:24

## 2022-07-31 RX ADMIN — Medication 1 MILLIGRAM(S): at 12:51

## 2022-07-31 RX ADMIN — LATANOPROST 1 DROP(S): 0.05 SOLUTION/ DROPS OPHTHALMIC; TOPICAL at 21:21

## 2022-07-31 RX ADMIN — Medication 30 MILLIGRAM(S): at 05:55

## 2022-07-31 RX ADMIN — Medication 30 MILLIGRAM(S): at 17:43

## 2022-07-31 NOTE — PROGRESS NOTE ADULT - ASSESSMENT
PLAN:    -now rate controlled with PO cardizem (per note review hx of reactive airway with Beta blockers)  -BP control with losartan  -HMG-COA  -on full dose lovenox now, am  -trend trop has had two sent already 68 --> 428 --> 529 -->250  -on ASA as well  -DVT prophylaxis covered with full dose lovenox  -per note review possible transfer for cardiac cath, is NPO after midnight tonight   -AM labs

## 2022-07-31 NOTE — PROGRESS NOTE ADULT - ASSESSMENT
imp  patient with af and chest pressure . last cad evaluation 4-5 yrs ago. Cardiac cath not unreasonable in this situation      suggest  repeat ekg  re-start irbesartan 300mg per day as she was on before  will call transfer center

## 2022-07-31 NOTE — PROGRESS NOTE ADULT - ASSESSMENT
77 yo woman with history of Hypertension, Right Carotid Artery Stenosis s/p stent, Asthma, Hypothyroidism, and Glaucoma comes to ED c/o chest pain.     #New Onset Atrial Fibrillation in RVR  #Elevated troponins  Currently HR < 100 after receiving IV and PO Cardizem in ED  Continue Telemetry Monitoring  Continue cardizem 30mg PO q 6hrs for now  Starting lovenox at therapeutic dose  CHADS2 score: 4  Continue Aspirin 81mg daily  Serial cardiac enzyme with EKG. EKG for repeat in AM  F/U TSH and CXR  F/U TTE  Cardiology evaluation noted    #Hypertension  Continue losartan at reduced dose since starting Cardizem  Suggest upon D/C to decrease irbesartan hoe dose by 1/2    #Right Carotid Artery Stenosis s/p stent  Continue Asa and statin (dose increased)  F/U Lipid panel    #Asthma  Stable  Continue Albuterol PRN    #Hypothyroidism  Continue synthroid  F/u TSH    #Glaucoma  Continue latanoprost     #DVT Prophylaxis - on lovenox    HCP Jason at bedside and aware of plan. [phone]      GOC: Family to have conversation about GOC. Please follow up. Full Code for now   77 yo woman with history of Hypertension, Right Carotid Artery Stenosis s/p stent, Asthma, Hypothyroidism, and Glaucoma comes to ED c/o chest pain.     #New Onset Atrial Fibrillation in RVR  #Elevated troponins  Currently HR < 100 after receiving IV and PO Cardizem in ED  Continue Telemetry Monitoring  Continue cardizem 30mg PO q 6hrs for now  C/w lovenox 70mg q 12hrs   CHADS2 score: 4  Continue Aspirin 81mg daily  NPO aftermidnight for stress testing in AM  Cardiology evaluation noted    #Hypertension  Continue losartan 100mg daily; takes irbesartan 300mg daily at home    #Right Carotid Artery Stenosis s/p stent  Continue Asa and statin (dose increased)    #Asthma  Stable  Continue Albuterol PRN    #Hypothyroidism  Will decrease synthroid to 88mcg daily; noted TSH  Needs to have TSH checked in 4-6 weeks    #Glaucoma  Continue latanoprost     #DVT Prophylaxis - on lovenox 70mg q12hrs  AM Labs  7/31: HCP Jason at bedside and aware of plan. [phone]    DISP: Pending course

## 2022-07-31 NOTE — PROGRESS NOTE ADULT - SUBJECTIVE AND OBJECTIVE BOX
Follow up for af  SUBJ: patient without further cardiac symptoms. however c/o headache has elevated bp. is off her irbesartan due to beginning diltiazem. back in sinus but with elevated troponin which are trending down  PMH  Hypothyroidism    HLD (hyperlipidemia)    HTN, age 0-18        MEDICATIONS  (STANDING):  aspirin  chewable 81 milliGRAM(s) Oral daily  atorvastatin 40 milliGRAM(s) Oral at bedtime  diltiazem    Tablet 30 milliGRAM(s) Oral every 6 hours  enoxaparin Injectable 70 milliGRAM(s) SubCutaneous every 12 hours  folic acid 1 milliGRAM(s) Oral daily  latanoprost 0.005% Ophthalmic Solution 1 Drop(s) Both EYES at bedtime  levothyroxine 88 MICROGram(s) Oral daily  regadenoson Injectable 0.4 milliGRAM(s) IV Push once    MEDICATIONS  (PRN):  acetaminophen     Tablet .. 650 milliGRAM(s) Oral every 6 hours PRN Temp greater or equal to 38C (100.4F), Mild Pain (1 - 3)  ALBUTerol    90 MICROgram(s) HFA Inhaler 2 Puff(s) Inhalation every 6 hours PRN Shortness of Breath and/or Wheezing  melatonin 3 milliGRAM(s) Oral at bedtime PRN Insomnia        PHYSICAL EXAM:  Vital Signs Last 24 Hrs  T(C): 36.6 (31 Jul 2022 05:52), Max: 36.7 (30 Jul 2022 17:47)  T(F): 97.9 (31 Jul 2022 05:52), Max: 98 (30 Jul 2022 17:47)  HR: 61 (31 Jul 2022 12:13) (57 - 63)  BP: 164/72 (31 Jul 2022 12:13) (154/67 - 183/73)  BP(mean): 68 (30 Jul 2022 20:30) (68 - 68)  RR: 18 (31 Jul 2022 05:52) (16 - 19)  SpO2: 98% (31 Jul 2022 05:52) (96% - 100%)    Parameters below as of 31 Jul 2022 05:52  Patient On (Oxygen Delivery Method): room air        GENERAL: NAD, well-groomed, well-developed  HEAD:  Atraumatic, Normocephalic  EYES: EOMI, PERRLA, conjunctiva and sclera clear  ENT: Moist mucous membranes,  NECK: Supple, No JVD, no bruits  CHEST/LUNG: Clear to percussion bilaterally; No rales, rhonchi, wheezing, or rubs  HEART: Regular rate and rhythm; No murmurs, rubs, or gallops PMI non displaced.  ABDOMEN: Soft, Nontender, Nondistended; Bowel sounds present  EXTREMITIES:  2+ Peripheral Pulses, No clubbing, cyanosis, or edema  SKIN: No rashes or lesions  NERVOUS SYSTEM:  Alert & Oriented X3, Good concentration; Motor Strength 5/5 B/L upper and lower extremities; DTRs 2+ intact and symmetric      TELEMETRY:rsr    ECG:    LABS:                        12.3   5.69  )-----------( 171      ( 31 Jul 2022 07:40 )             37.0     07-31    140  |  107  |  14  ----------------------------<  127<H>  4.0   |  25  |  0.79    Ca    8.9      31 Jul 2022 07:40  Phos  3.0     07-31  Mg     2.1     07-31    TPro  6.7  /  Alb  3.0<L>  /  TBili  0.5  /  DBili  x   /  AST  20  /  ALT  18  /  AlkPhos  78  07-31        PT/INR - ( 30 Jul 2022 09:50 )   PT: 10.5 sec;   INR: 0.91 ratio         PTT - ( 30 Jul 2022 19:10 )  PTT:>200.0 sec    I&O's Summary    BNP    RADIOLOGY & ADDITIONAL STUDIES:    ECHO:

## 2022-07-31 NOTE — PROGRESS NOTE ADULT - SUBJECTIVE AND OBJECTIVE BOX
F/U Note:    76y Female admitted with chest pain/pressure, NSTEMI in setting of rapid afib    Interval Hx:  - trops trending down today  -back in NSR today     Vital Signs Last 24 Hrs  T(C): 36.6 (31 Jul 2022 19:56), Max: 36.6 (30 Jul 2022 20:30)  T(F): 97.9 (31 Jul 2022 19:56), Max: 97.9 (31 Jul 2022 05:52)  HR: 54 (31 Jul 2022 19:56) (54 - 63)  BP: 183/70 (31 Jul 2022 19:56) (161/70 - 183/73)  BP(mean): 68 (30 Jul 2022 20:30) (68 - 68)  RR: 18 (31 Jul 2022 19:56) (16 - 18)  SpO2: 98% (31 Jul 2022 19:56) (98% - 98%)    Parameters below as of 31 Jul 2022 19:56  Patient On (Oxygen Delivery Method): room air                                12.3   5.69  )-----------( 171      ( 31 Jul 2022 07:40 )             37.0         07-31    140  |  107  |  14  ----------------------------<  127<H>  4.0   |  25  |  0.79    Ca    8.9      31 Jul 2022 07:40  Phos  3.0     07-31  Mg     2.1     07-31    TPro  6.7  /  Alb  3.0<L>  /  TBili  0.5  /  DBili  x   /  AST  20  /  ALT  18  /  AlkPhos  78  07-31        NEURO: no headaches, blurry vision, tremors, depression, anxity  CV: no chest pain, palpitations, murmurs, orthopnea  Resp: no shortness of breath, cough, wheeze, sputum production  GI: no stomach pain,nausea, vomitting, flatulence, hematemesis, hematochezia  PV: no swelling of extremities, no hair loss, no coolness to extremities  ENDO: no polydypsia, polyphagia, polyuria, weight loss, night sweats          NEURO: awake and alert  CV: (+) S1/S2, rrr, no mrg  RESP: CTA b/l  GI: soft, non tender

## 2022-07-31 NOTE — PROGRESS NOTE ADULT - SUBJECTIVE AND OBJECTIVE BOX
Patient is a 76y old  Female who presents with a chief complaint of chest pain (2022 20:01)      Patient seen and examined at bedside.    ALLERGIES:  No Known Allergies    MEDICATIONS  (STANDING):  aspirin  chewable 81 milliGRAM(s) Oral daily  atorvastatin 40 milliGRAM(s) Oral at bedtime  diltiazem    Tablet 30 milliGRAM(s) Oral every 6 hours  enoxaparin Injectable 70 milliGRAM(s) SubCutaneous every 12 hours  folic acid 1 milliGRAM(s) Oral daily  latanoprost 0.005% Ophthalmic Solution 1 Drop(s) Both EYES at bedtime  levothyroxine 112 MICROGram(s) Oral daily  losartan 50 milliGRAM(s) Oral daily    MEDICATIONS  (PRN):  acetaminophen     Tablet .. 650 milliGRAM(s) Oral every 6 hours PRN Temp greater or equal to 38C (100.4F), Mild Pain (1 - 3)  ALBUTerol    90 MICROgram(s) HFA Inhaler 2 Puff(s) Inhalation every 6 hours PRN Shortness of Breath and/or Wheezing  melatonin 3 milliGRAM(s) Oral at bedtime PRN Insomnia    Vital Signs Last 24 Hrs  T(F): 97.9 (2022 05:52), Max: 98.7 (2022 09:37)  HR: 57 (2022 06:34) (57 - 156)  BP: 179/66 (2022 06:34) (106/89 - 196/134)  RR: 18 (2022 05:52) (16 - 19)  SpO2: 98% (2022 05:52) (96% - 100%)  I&O's Summary      PHYSICAL EXAM:  General: NAD, A/O x 3  ENT: MMM, no scleral icterus  Neck: Supple, No JVD, no thyroidomegaly  Lungs: Clear to auscultation bilaterally, no wheezes, no rales, no rhonchi, good inspiratory effort  Cardio: RRR, S1/S2, No murmurs  Abdomen: Soft, Nontender, Nondistended; Bowel sounds present  Extremities: No calf tenderness, No pitting edema, no skin changes    LABS:                        12.3   7.09  )-----------( 172      ( 2022 19:10 )             37.5       07-30    138  |  105  |  22  ----------------------------<  156  3.9   |  25  |  0.85    Ca    8.7      2022 09:50    TPro  7.4  /  Alb  3.4  /  TBili  0.3  /  DBili  x   /  AST  19  /  ALT  17  /  AlkPhos  84  07-30       PT/INR - ( 2022 09:50 )   PT: 10.5 sec;   INR: 0.91 ratio         PTT - ( 2022 19:10 )  PTT:>200.0 sec     CARDIAC MARKERS ( 2022 20:30 )  x     / 529.7 ng/L / x     / x     / x      CARDIAC MARKERS ( 2022 14:20 )  x     / 428.2 ng/L / x     / x     / x      CARDIAC MARKERS ( 2022 09:50 )  x     / 68.3 ng/L / x     / x     / x        TSH 0.157   TSH with FT4 reflex --  Total T3 121    Urinalysis Basic - ( 2022 14:20 )    Color: Yellow / Appearance: Clear / S.010 / pH: x  Gluc: x / Ketone: Negative  / Bili: Negative / Urobili: Negative   Blood: x / Protein: Negative / Nitrite: Negative   Leuk Esterase: Moderate / RBC: 0-4 /HPF / WBC 0-2 /HPF   Sq Epi: x / Non Sq Epi: Moderate / Bacteria: Negative /HPF    COVID-19 PCR: NotDetec (22 @ 09:50)  COVID-19 PCR: NotDetec (22 @ 09:50)      RADIOLOGY & ADDITIONAL TESTS:  Xray Chest 1 View- PORTABLE-Urgent (22 @ 10:04) >  IMPRESSION:  No focal consolidation.    Care Discussed with Consultants/Other Providers:    Patient is a 76y old  Female who presents with a chief complaint of chest pain (2022 20:01)    Denies chest pain, SOB, palpatations    Patient seen and examined at bedside.    ALLERGIES:  No Known Allergies    MEDICATIONS  (STANDING):  aspirin  chewable 81 milliGRAM(s) Oral daily  atorvastatin 40 milliGRAM(s) Oral at bedtime  diltiazem    Tablet 30 milliGRAM(s) Oral every 6 hours  enoxaparin Injectable 70 milliGRAM(s) SubCutaneous every 12 hours  folic acid 1 milliGRAM(s) Oral daily  latanoprost 0.005% Ophthalmic Solution 1 Drop(s) Both EYES at bedtime  levothyroxine 112 MICROGram(s) Oral daily  losartan 50 milliGRAM(s) Oral daily    MEDICATIONS  (PRN):  acetaminophen     Tablet .. 650 milliGRAM(s) Oral every 6 hours PRN Temp greater or equal to 38C (100.4F), Mild Pain (1 - 3)  ALBUTerol    90 MICROgram(s) HFA Inhaler 2 Puff(s) Inhalation every 6 hours PRN Shortness of Breath and/or Wheezing  melatonin 3 milliGRAM(s) Oral at bedtime PRN Insomnia    Vital Signs Last 24 Hrs  T(F): 97.9 (2022 05:52), Max: 98.7 (2022 09:37)  HR: 57 (2022 06:34) (57 - 156)  BP: 179/66 (2022 06:34) (106/89 - 196/134)  RR: 18 (2022 05:52) (16 - 19)  SpO2: 98% (2022 05:52) (96% - 100%)  I&O's Summary      PHYSICAL EXAM:  General: NAD, A/O x 3, speaking in full sentences  ENT: MMM, no scleral icterus  Neck: Supple, No JVD, no thyroidomegaly  Lungs: Clear to auscultation bilaterally, no wheezes, no rales, no rhonchi, good inspiratory effort  Cardio: RRR, S1/S2, No murmurs  Abdomen: Soft, Nontender, Nondistended; Bowel sounds present  Extremities: No calf tenderness, No pitting edema, no skin changes    LABS:                        12.3   7.09  )-----------( 172      ( 2022 19:10 )             37.5       07-30    138  |  105  |  22  ----------------------------<  156  3.9   |  25  |  0.85    Ca    8.7      2022 09:50    TPro  7.4  /  Alb  3.4  /  TBili  0.3  /  DBili  x   /  AST  19  /  ALT  17  /  AlkPhos  84         PT/INR - ( 2022 09:50 )   PT: 10.5 sec;   INR: 0.91 ratio         PTT - ( 2022 19:10 )  PTT:>200.0 sec     CARDIAC MARKERS ( 2022 20:30 )  x     / 529.7 ng/L / x     / x     / x      CARDIAC MARKERS ( 2022 14:20 )  x     / 428.2 ng/L / x     / x     / x      CARDIAC MARKERS ( 2022 09:50 )  x     / 68.3 ng/L / x     / x     / x        TSH 0.157   TSH with FT4 reflex --  Total T3 121    Urinalysis Basic - ( 2022 14:20 )    Color: Yellow / Appearance: Clear / S.010 / pH: x  Gluc: x / Ketone: Negative  / Bili: Negative / Urobili: Negative   Blood: x / Protein: Negative / Nitrite: Negative   Leuk Esterase: Moderate / RBC: 0-4 /HPF / WBC 0-2 /HPF   Sq Epi: x / Non Sq Epi: Moderate / Bacteria: Negative /HPF    COVID-19 PCR: NotDetec (22 @ 09:50)  COVID-19 PCR: NotDetec (22 @ 09:50)      RADIOLOGY & ADDITIONAL TESTS:  Xray Chest 1 View- PORTABLE-Urgent (22 @ 10:04) >  IMPRESSION:  No focal consolidation.    Care Discussed with Consultants/Other Providers: Cardiology: Stress testing in AM

## 2022-08-01 ENCOUNTER — INPATIENT (INPATIENT)
Facility: HOSPITAL | Age: 77
LOS: 0 days | Discharge: HOME | DRG: 287 | End: 2022-08-02
Attending: INTERNAL MEDICINE | Admitting: INTERNAL MEDICINE
Payer: COMMERCIAL

## 2022-08-01 ENCOUNTER — TRANSCRIPTION ENCOUNTER (OUTPATIENT)
Age: 77
End: 2022-08-01

## 2022-08-01 VITALS
OXYGEN SATURATION: 98 % | HEIGHT: 66 IN | WEIGHT: 169.09 LBS | HEART RATE: 58 BPM | RESPIRATION RATE: 17 BRPM | TEMPERATURE: 97 F | DIASTOLIC BLOOD PRESSURE: 73 MMHG | SYSTOLIC BLOOD PRESSURE: 188 MMHG

## 2022-08-01 VITALS
OXYGEN SATURATION: 98 % | HEART RATE: 66 BPM | RESPIRATION RATE: 17 BRPM | TEMPERATURE: 98 F | SYSTOLIC BLOOD PRESSURE: 166 MMHG | DIASTOLIC BLOOD PRESSURE: 69 MMHG

## 2022-08-01 DIAGNOSIS — Z90.710 ACQUIRED ABSENCE OF BOTH CERVIX AND UTERUS: Chronic | ICD-10-CM

## 2022-08-01 DIAGNOSIS — Z90.49 ACQUIRED ABSENCE OF OTHER SPECIFIED PARTS OF DIGESTIVE TRACT: Chronic | ICD-10-CM

## 2022-08-01 DIAGNOSIS — I21.4 NON-ST ELEVATION (NSTEMI) MYOCARDIAL INFARCTION: ICD-10-CM

## 2022-08-01 DIAGNOSIS — Z98.890 OTHER SPECIFIED POSTPROCEDURAL STATES: Chronic | ICD-10-CM

## 2022-08-01 PROBLEM — E78.5 HYPERLIPIDEMIA, UNSPECIFIED: Chronic | Status: ACTIVE | Noted: 2022-07-30

## 2022-08-01 PROBLEM — E03.9 HYPOTHYROIDISM, UNSPECIFIED: Chronic | Status: ACTIVE | Noted: 2022-07-30

## 2022-08-01 LAB
ANION GAP SERPL CALC-SCNC: 9 MMOL/L — SIGNIFICANT CHANGE UP (ref 5–17)
BUN SERPL-MCNC: 16 MG/DL — SIGNIFICANT CHANGE UP (ref 7–23)
CALCIUM SERPL-MCNC: 9.1 MG/DL — SIGNIFICANT CHANGE UP (ref 8.4–10.5)
CHLORIDE SERPL-SCNC: 106 MMOL/L — SIGNIFICANT CHANGE UP (ref 96–108)
CO2 SERPL-SCNC: 26 MMOL/L — SIGNIFICANT CHANGE UP (ref 22–31)
CREAT SERPL-MCNC: 0.82 MG/DL — SIGNIFICANT CHANGE UP (ref 0.5–1.3)
D DIMER BLD IA.RAPID-MCNC: <150 NG/ML DDU — SIGNIFICANT CHANGE UP
EGFR: 74 ML/MIN/1.73M2 — SIGNIFICANT CHANGE UP
GLUCOSE SERPL-MCNC: 127 MG/DL — HIGH (ref 70–99)
HCT VFR BLD CALC: 40 % — SIGNIFICANT CHANGE UP (ref 34.5–45)
HGB BLD-MCNC: 13.2 G/DL — SIGNIFICANT CHANGE UP (ref 11.5–15.5)
MAGNESIUM SERPL-MCNC: 1.9 MG/DL — SIGNIFICANT CHANGE UP (ref 1.6–2.6)
MCHC RBC-ENTMCNC: 28.6 PG — SIGNIFICANT CHANGE UP (ref 27–34)
MCHC RBC-ENTMCNC: 33 GM/DL — SIGNIFICANT CHANGE UP (ref 32–36)
MCV RBC AUTO: 86.6 FL — SIGNIFICANT CHANGE UP (ref 80–100)
NRBC # BLD: 0 /100 WBCS — SIGNIFICANT CHANGE UP (ref 0–0)
PLATELET # BLD AUTO: 182 K/UL — SIGNIFICANT CHANGE UP (ref 150–400)
POTASSIUM SERPL-MCNC: 4 MMOL/L — SIGNIFICANT CHANGE UP (ref 3.5–5.3)
POTASSIUM SERPL-SCNC: 4 MMOL/L — SIGNIFICANT CHANGE UP (ref 3.5–5.3)
RBC # BLD: 4.62 M/UL — SIGNIFICANT CHANGE UP (ref 3.8–5.2)
RBC # FLD: 12.7 % — SIGNIFICANT CHANGE UP (ref 10.3–14.5)
SODIUM SERPL-SCNC: 141 MMOL/L — SIGNIFICANT CHANGE UP (ref 135–145)
WBC # BLD: 6.12 K/UL — SIGNIFICANT CHANGE UP (ref 3.8–10.5)
WBC # FLD AUTO: 6.12 K/UL — SIGNIFICANT CHANGE UP (ref 3.8–10.5)

## 2022-08-01 PROCEDURE — 93306 TTE W/DOPPLER COMPLETE: CPT

## 2022-08-01 PROCEDURE — 84100 ASSAY OF PHOSPHORUS: CPT

## 2022-08-01 PROCEDURE — 84484 ASSAY OF TROPONIN QUANT: CPT

## 2022-08-01 PROCEDURE — 81001 URINALYSIS AUTO W/SCOPE: CPT

## 2022-08-01 PROCEDURE — 83735 ASSAY OF MAGNESIUM: CPT

## 2022-08-01 PROCEDURE — 84443 ASSAY THYROID STIM HORMONE: CPT

## 2022-08-01 PROCEDURE — 96374 THER/PROPH/DIAG INJ IV PUSH: CPT

## 2022-08-01 PROCEDURE — 86803 HEPATITIS C AB TEST: CPT

## 2022-08-01 PROCEDURE — 85610 PROTHROMBIN TIME: CPT

## 2022-08-01 PROCEDURE — 84480 ASSAY TRIIODOTHYRONINE (T3): CPT

## 2022-08-01 PROCEDURE — 85730 THROMBOPLASTIN TIME PARTIAL: CPT

## 2022-08-01 PROCEDURE — 85027 COMPLETE CBC AUTOMATED: CPT

## 2022-08-01 PROCEDURE — 84436 ASSAY OF TOTAL THYROXINE: CPT

## 2022-08-01 PROCEDURE — 99239 HOSP IP/OBS DSCHRG MGMT >30: CPT

## 2022-08-01 PROCEDURE — 85025 COMPLETE CBC W/AUTO DIFF WBC: CPT

## 2022-08-01 PROCEDURE — 80061 LIPID PANEL: CPT

## 2022-08-01 PROCEDURE — 99152 MOD SED SAME PHYS/QHP 5/>YRS: CPT

## 2022-08-01 PROCEDURE — 36415 COLL VENOUS BLD VENIPUNCTURE: CPT

## 2022-08-01 PROCEDURE — 80053 COMPREHEN METABOLIC PANEL: CPT

## 2022-08-01 PROCEDURE — 93458 L HRT ARTERY/VENTRICLE ANGIO: CPT | Mod: 26

## 2022-08-01 PROCEDURE — 80048 BASIC METABOLIC PNL TOTAL CA: CPT

## 2022-08-01 PROCEDURE — 93010 ELECTROCARDIOGRAM REPORT: CPT

## 2022-08-01 PROCEDURE — 71045 X-RAY EXAM CHEST 1 VIEW: CPT

## 2022-08-01 PROCEDURE — 93005 ELECTROCARDIOGRAM TRACING: CPT

## 2022-08-01 PROCEDURE — 87635 SARS-COV-2 COVID-19 AMP PRB: CPT

## 2022-08-01 PROCEDURE — 99285 EMERGENCY DEPT VISIT HI MDM: CPT | Mod: 25

## 2022-08-01 RX ORDER — ALBUTEROL 90 UG/1
2 AEROSOL, METERED ORAL
Qty: 0 | Refills: 0 | DISCHARGE
Start: 2022-08-01

## 2022-08-01 RX ORDER — ATORVASTATIN CALCIUM 80 MG/1
1 TABLET, FILM COATED ORAL
Qty: 0 | Refills: 0 | DISCHARGE

## 2022-08-01 RX ORDER — SODIUM CHLORIDE 9 MG/ML
250 INJECTION INTRAMUSCULAR; INTRAVENOUS; SUBCUTANEOUS ONCE
Refills: 0 | Status: COMPLETED | OUTPATIENT
Start: 2022-08-01 | End: 2022-08-01

## 2022-08-01 RX ORDER — ALBUTEROL 90 UG/1
2 AEROSOL, METERED ORAL
Qty: 0 | Refills: 0 | DISCHARGE

## 2022-08-01 RX ORDER — DILTIAZEM HCL 120 MG
1 CAPSULE, EXT RELEASE 24 HR ORAL
Qty: 0 | Refills: 0 | DISCHARGE
Start: 2022-08-01

## 2022-08-01 RX ORDER — FOLIC ACID 0.8 MG
1 TABLET ORAL DAILY
Refills: 0 | Status: DISCONTINUED | OUTPATIENT
Start: 2022-08-01 | End: 2022-08-02

## 2022-08-01 RX ORDER — APIXABAN 2.5 MG/1
1 TABLET, FILM COATED ORAL
Qty: 60 | Refills: 0
Start: 2022-08-01 | End: 2022-08-30

## 2022-08-01 RX ORDER — LEVOTHYROXINE SODIUM 125 MCG
88 TABLET ORAL DAILY
Refills: 0 | Status: DISCONTINUED | OUTPATIENT
Start: 2022-08-01 | End: 2022-08-02

## 2022-08-01 RX ORDER — ENOXAPARIN SODIUM 100 MG/ML
70 INJECTION SUBCUTANEOUS
Qty: 0 | Refills: 0 | DISCHARGE

## 2022-08-01 RX ORDER — ALBUTEROL 90 UG/1
2 AEROSOL, METERED ORAL EVERY 6 HOURS
Refills: 0 | Status: DISCONTINUED | OUTPATIENT
Start: 2022-08-01 | End: 2022-08-02

## 2022-08-01 RX ORDER — DILTIAZEM HCL 120 MG
120 CAPSULE, EXT RELEASE 24 HR ORAL DAILY
Refills: 0 | Status: DISCONTINUED | OUTPATIENT
Start: 2022-08-01 | End: 2022-08-02

## 2022-08-01 RX ORDER — LOSARTAN POTASSIUM 100 MG/1
50 TABLET, FILM COATED ORAL DAILY
Refills: 0 | Status: DISCONTINUED | OUTPATIENT
Start: 2022-08-02 | End: 2022-08-02

## 2022-08-01 RX ORDER — ATORVASTATIN CALCIUM 80 MG/1
10 TABLET, FILM COATED ORAL AT BEDTIME
Refills: 0 | Status: DISCONTINUED | OUTPATIENT
Start: 2022-08-01 | End: 2022-08-02

## 2022-08-01 RX ORDER — ASPIRIN/CALCIUM CARB/MAGNESIUM 324 MG
1 TABLET ORAL
Qty: 0 | Refills: 0 | DISCHARGE
Start: 2022-08-01

## 2022-08-01 RX ORDER — ASPIRIN/CALCIUM CARB/MAGNESIUM 324 MG
0 TABLET ORAL
Qty: 0 | Refills: 0 | DISCHARGE

## 2022-08-01 RX ORDER — LANOLIN ALCOHOL/MO/W.PET/CERES
1 CREAM (GRAM) TOPICAL
Qty: 0 | Refills: 0 | DISCHARGE

## 2022-08-01 RX ORDER — LATANOPROST 0.05 MG/ML
1 SOLUTION/ DROPS OPHTHALMIC; TOPICAL
Qty: 0 | Refills: 0 | DISCHARGE

## 2022-08-01 RX ORDER — LOSARTAN POTASSIUM 100 MG/1
1 TABLET, FILM COATED ORAL
Qty: 0 | Refills: 0 | DISCHARGE

## 2022-08-01 RX ORDER — LEVOTHYROXINE SODIUM 125 MCG
1 TABLET ORAL
Qty: 0 | Refills: 0 | DISCHARGE
Start: 2022-08-01

## 2022-08-01 RX ORDER — IRBESARTAN 75 MG/1
0 TABLET ORAL
Qty: 0 | Refills: 0 | DISCHARGE

## 2022-08-01 RX ORDER — LATANOPROST 0.05 MG/ML
1 SOLUTION/ DROPS OPHTHALMIC; TOPICAL AT BEDTIME
Refills: 0 | Status: DISCONTINUED | OUTPATIENT
Start: 2022-08-01 | End: 2022-08-02

## 2022-08-01 RX ORDER — IRBESARTAN 75 MG/1
1 TABLET ORAL
Qty: 0 | Refills: 0 | DISCHARGE

## 2022-08-01 RX ORDER — FOLIC ACID 0.8 MG
1 TABLET ORAL
Qty: 0 | Refills: 0 | DISCHARGE
Start: 2022-08-01

## 2022-08-01 RX ORDER — FOLIC ACID 0.8 MG
0 TABLET ORAL
Qty: 0 | Refills: 0 | DISCHARGE

## 2022-08-01 RX ORDER — LATANOPROST 0.05 MG/ML
1 SOLUTION/ DROPS OPHTHALMIC; TOPICAL
Qty: 0 | Refills: 0 | DISCHARGE
Start: 2022-08-01

## 2022-08-01 RX ORDER — ATORVASTATIN CALCIUM 80 MG/1
0 TABLET, FILM COATED ORAL
Qty: 0 | Refills: 0 | DISCHARGE

## 2022-08-01 RX ORDER — LEVOTHYROXINE SODIUM 125 MCG
0 TABLET ORAL
Qty: 0 | Refills: 0 | DISCHARGE

## 2022-08-01 RX ORDER — DILTIAZEM HCL 120 MG
1 CAPSULE, EXT RELEASE 24 HR ORAL
Qty: 30 | Refills: 0
Start: 2022-08-01 | End: 2022-08-30

## 2022-08-01 RX ORDER — ATORVASTATIN CALCIUM 80 MG/1
1 TABLET, FILM COATED ORAL
Qty: 0 | Refills: 0 | DISCHARGE
Start: 2022-08-01

## 2022-08-01 RX ORDER — DILTIAZEM HCL 120 MG
120 CAPSULE, EXT RELEASE 24 HR ORAL ONCE
Refills: 0 | Status: COMPLETED | OUTPATIENT
Start: 2022-08-01 | End: 2022-08-01

## 2022-08-01 RX ADMIN — Medication 120 MILLIGRAM(S): at 13:46

## 2022-08-01 RX ADMIN — LOSARTAN POTASSIUM 100 MILLIGRAM(S): 100 TABLET, FILM COATED ORAL at 05:07

## 2022-08-01 RX ADMIN — ATORVASTATIN CALCIUM 10 MILLIGRAM(S): 80 TABLET, FILM COATED ORAL at 21:38

## 2022-08-01 RX ADMIN — LATANOPROST 1 DROP(S): 0.05 SOLUTION/ DROPS OPHTHALMIC; TOPICAL at 21:38

## 2022-08-01 RX ADMIN — Medication 30 MILLIGRAM(S): at 05:07

## 2022-08-01 RX ADMIN — Medication 88 MICROGRAM(S): at 05:07

## 2022-08-01 RX ADMIN — SODIUM CHLORIDE 500 MILLILITER(S): 9 INJECTION INTRAMUSCULAR; INTRAVENOUS; SUBCUTANEOUS at 18:55

## 2022-08-01 NOTE — PROGRESS NOTE ADULT - ASSESSMENT
77 yo woman with history of Hypertension, Right Carotid Artery Stenosis s/p stent, Asthma, Hypothyroidism, and Glaucoma comes to ED c/o chest pain.     #New Onset Atrial Fibrillation in RVR  #Elevated troponins  Stable for transfer to [facility] for cardiac cath as per cardiology  Continue Telemetry Monitoring  Continue cardizem 30mg PO q 6hrs for now  Holding lovenox this AM for cath  CHADS2 score: 4  Continue Aspirin 81mg daily  NPO aftermidnight for cath  Cardiology evaluation noted    #Hypertension  Continue losartan 100mg daily; takes irbesartan 300mg daily at home    #Right Carotid Artery Stenosis s/p stent  Continue Asa and statin (dose increased)    #Asthma  Stable  Continue Albuterol PRN    #Hypothyroidism  Will decrease synthroid to 88mcg daily; noted TSH  Needs to have TSH checked in 4-6 weeks    #Glaucoma  Continue latanoprost     #DVT Prophylaxis   D/c 45 min  HCP Jason son notified and at bedside

## 2022-08-01 NOTE — H&P CARDIOLOGY - HISTORY OF PRESENT ILLNESS
77 y/o woman with history of Hypertension, Right Carotid Artery Stenosis s/p stent, Asthma, Hypothyroidism, and Glaucoma, reported normal cath 8 yrs ago with minimal disease, presented to [place] ED on 7/30/22 to ED c/o chest pain. Patient said she went back to sleep around 5am when she suddenly woke up with left sided chest pain/pressure 9/10. Pt was noted to have new onset AFib in ED. Troponins were elevated 250<-529<-428<-68. Beta blockers were avoided per PMD due to reactive airway disease. TTE showed EF 55-60%; grade II diastolic dysfunction. Transferred to [facility] today for further evaluation.  75 y/o woman, moved to  from [place] 1 week ago, with history of Hypertension, Right Subclavian Artery Stenosis s/p stent, Asthma, Hypothyroidism, and Glaucoma, reported normal cath 8 yrs ago with minimal disease, presented to [place] ED on 7/30/22 to ED c/o chest pain. Patient said she went back to sleep around 5am when she suddenly woke up with left sided chest pain/pressure 9/10. Pt was noted to have new onset AFib in ED. Troponins were elevated 250<-529<-428<-68. Beta blockers were avoided per PMD due to reactive airway disease. TTE showed EF 55-60%; grade II diastolic dysfunction. Transferred to [facility] today for further evaluation.   Cards at [place]: Dr. Ley    Pt's cardiologist (also a family member): Dr. Azam Jackson [phone]  please call post cath 77 y/o woman, moved to  from [place] 1 week ago, with history of Hypertension, Right Subclavian Artery Stenosis s/p stent, Asthma, Hypothyroidism, and Glaucoma, reported normal cath 8 yrs ago with minimal disease, presented to [place] ED on 7/30/22 to ED c/o chest pain. Patient said she went back to sleep around 5am when she suddenly woke up with left sided chest pain/pressure 9/10. Pt was noted to have new onset AFib in ED. Troponins were elevated 250<-529<-428<-68. Beta blockers were avoided per PMD due to reactive airway disease. TTE showed EF 55-60%; grade II diastolic dysfunction. Transferred to [facility] today for further evaluation.   Cards at [place]: Dr. Ley  *Lovenox 70mg 7/31 at 2130    Pt's cardiologist (also a family member): Dr. Azam Jackson [phone]  please call post cath

## 2022-08-01 NOTE — DISCHARGE NOTE PROVIDER - NSDCMRMEDTOKEN_GEN_ALL_CORE_FT
albuterol 90 mcg/inh inhalation aerosol: 2 puff(s) inhaled every 6 hours, As needed, Shortness of Breath and/or Wheezing  aspirin 81 mg oral tablet, chewable: 1 tab(s) orally once a day  atorvastatin 40 mg oral tablet: 1 tab(s) orally once a day (at bedtime)  dilTIAZem 30 mg oral tablet: 1 tab(s) orally every 6 hours  folic acid 1 mg oral tablet: 1 tab(s) orally once a day  irbesartan:   latanoprost 0.005% ophthalmic solution: 1 drop(s) to each affected eye once a day (at bedtime)  levothyroxine 88 mcg (0.088 mg) oral tablet: 1 tab(s) orally once a day

## 2022-08-01 NOTE — CHART NOTE - NSCHARTNOTEFT_GEN_A_CORE
Pt with symptomatic orthostasis- she feels dizzy with ambulation.   Remains in NSR.  Right groin site no bleeding or hematoma.  -Admit to tele for overnight monitoring  -NS 250cc bolus then repeat orthostatics  -Decrease Losartan to 50mg daily (will need to be discharged on 1/2 dose of irbesartan)  -Check D-Dimer and LE venous dopplers  -Admit to medicine if pt requires more than 1 night stay    Above discussed with Dr. Barton and pt's cardiologist Dr. Manuel Chang, AMARIS-C

## 2022-08-01 NOTE — DISCHARGE NOTE PROVIDER - NSDCCPCAREPLAN_GEN_ALL_CORE_FT
PRINCIPAL DISCHARGE DIAGNOSIS  Diagnosis: Chest pain  Assessment and Plan of Treatment: The goal is that you will remain chest pain free and understand post cath discharge instructions.   No heavy lifting, strenuous activity, bending, straining, or unnecessary stair climbing for 2 weeks. No driving for 2 days. You may shower 24 hours following the procedure but avoid baths/swimming for 1 week. Check your groin site for bleeding and/or swelling daily following procedure and call your doctor immediately if it occurs or if you experience increased pain at the site. Follow up with your cardiologist in 1-2 weeks. You may call [facility]  if you have any questions/concerns regarding your procedure [phone].      SECONDARY DISCHARGE DIAGNOSES  Diagnosis: Paroxysmal atrial fibrillation  Assessment and Plan of Treatment: Continue with your cardiologist and primary care MD. Continue your current medications. Call your physician for palpitations, feelings of rapid heart beat, lightheadedness, or dizziness. Ask your nurse for written material about Eliquis and to provide patient education before discharge.    Diagnosis: HTN (hypertension)  Assessment and Plan of Treatment: Continue with your blood pressure medications; eat a heart healthy diet with low salt diet; exercise regularly (consult with your physician or cardiologist first); maintain a heart healthy weight; if you smoke - quit (A resource to help you stop smoking is the [facility] [employer] – phone number [phone].); include healthy ways to manage stress. Continue to follow with your primary care physician or cardiologist.    Diagnosis: HLD (hyperlipidemia)  Assessment and Plan of Treatment: Continue with your cholesterol medications. Eat a heart healthy diet that is low in saturated fats and salt, and includes whole grains, fruits, vegetables and lean protein; exercise regularly (consult with your physician or cardiologist first); maintain a heart healthy weight; if you smoke - quit (A resource to help you stop smoking is the [facility] [employer] – phone number [phone].). Continue to follow with your primary physician or cardiologist.

## 2022-08-01 NOTE — H&P CARDIOLOGY - NSICDXPASTMEDICALHX_GEN_ALL_CORE_FT
PAST MEDICAL HISTORY:  Afib     Asthma     Glaucoma     H/O carotid artery stenosis s/p stent    HLD (hyperlipidemia)     HTN (hypertension)     Hypothyroidism      PAST MEDICAL HISTORY:  Afib     Asthma     Carotid artery disease     Glaucoma     HLD (hyperlipidemia)     HTN (hypertension)     Hypothyroidism     Subclavian artery stenosis

## 2022-08-01 NOTE — DISCHARGE NOTE PROVIDER - CARE PROVIDERS DIRECT ADDRESSES
,DirectAddress_Unknown ,DirectAddress_Unknown,[email or web address]

## 2022-08-01 NOTE — DISCHARGE NOTE PROVIDER - HOSPITAL COURSE
77 y/o woman, moved to  from [place] 1 week ago, with history of Hypertension, Right Subclavian Artery Stenosis s/p stent, Asthma, Hypothyroidism, and Glaucoma, reported normal cath 8 yrs ago with minimal disease, presented to [place] ED on 7/30/22 to ED c/o chest pain. Patient said she went back to sleep around 5am when she suddenly woke up with left sided chest pain/pressure 9/10. Pt was noted to have new onset AFib in ED. Troponins were elevated 250<-529<-428<-68. Beta blockers were avoided per PMD due to reactive airway disease. TTE showed EF 55-60%; grade II diastolic dysfunction. Transferred to [facility] today for further evaluation.   Cards at [place]: Dr. Ley  *Lovenox 70mg 7/31 at 2130    Pt's cardiologist (also a family member): Dr. Azam Jackson [phone]    Pt is s/p diagnostic LHC via right femoral artery today showing normal coronaries.   Plan as discussed with Dr. Barton:  -Start Cardizem CD 120mg daily at next scheduled dose  -Start Eliquis 5mg BID tomorrow evening   -Continue Irbesartan 300mg  -Follow up with Dr. Jackson in 1-2 weeks    Pt tolerated procedure well with no post complications and hospitalization remained uneventful. Pt is hemodynamically stable and right femoral artery site benign. No c/o chest pain or SOB. Discharge teaching provided to patient and family and verbalized understanding of instructions. Pt is stable for discharge home as per attending.      77 y/o woman, moved to  from [place] 1 week ago, with history of Hypertension, Right Subclavian Artery Stenosis s/p stent, Asthma, Hypothyroidism, and Glaucoma, reported normal cath 8 yrs ago with minimal disease, presented to [place] ED on 7/30/22 to ED c/o chest pain. Patient said she went back to sleep around 5am when she suddenly woke up with left sided chest pain/pressure 9/10. Pt was noted to have new onset AFib in ED. Troponins were elevated 250<-529<-428<-68. Beta blockers were avoided per PMD due to reactive airway disease. TTE showed EF 55-60%; grade II diastolic dysfunction. Transferred to [facility] today for further evaluation.   Cards at [place]: Dr. Ley  *Lovenox 70mg 7/31 at 2130    Pt's cardiologist (also a family member): Dr. Azam Jackson [phone]    Pt is s/p diagnostic LHC via right femoral artery today showing normal coronaries.   Plan as discussed with Dr. Barton:  -Start Cardizem CD 120mg daily at next scheduled dose  -Start Eliquis 5mg BID tomorrow evening   -Decrease Irbesartan to 150mg daily in setting of orthostatic hypotension  -Follow up with Dr. Jackson in 1-2 weeks         77 y/o woman, moved to  from [place] 1 week ago, with history of Hypertension, Right Subclavian Artery Stenosis s/p stent, Asthma, Hypothyroidism, and Glaucoma, reported normal cath 8 yrs ago with minimal disease, presented to [place] ED on 7/30/22 to ED c/o chest pain. Patient said she went back to sleep around 5am when she suddenly woke up with left sided chest pain/pressure 9/10. Pt was noted to have new onset AFib in ED. Troponins were elevated 250<-529<-428<-68. Beta blockers were avoided per PMD due to reactive airway disease. TTE showed EF 55-60%; grade II diastolic dysfunction. Transferred to [facility] today for further evaluation.   Cards at [place]: Dr. Ley  *Lovenox 70mg 7/31 at 2130    Pt's cardiologist (also a family member): Dr. Azam Jackson [phone]    Pt is s/p diagnostic LHC via right femoral artery today showing normal coronaries.   Plan as discussed with Dr. Barton:  -Start Cardizem CD 120mg daily at next scheduled dose  -Start Eliquis 5mg BID tomorrow evening   -Decrease Irbesartan to 150mg daily in setting of orthostatic hypotension  -Follow up with Dr. Jackson in 1-2 weeks    Lower ext doppler - negative for DVT

## 2022-08-01 NOTE — DISCHARGE NOTE PROVIDER - NSDCCPCAREPLAN_GEN_ALL_CORE_FT
PRINCIPAL DISCHARGE DIAGNOSIS  Diagnosis: Rapid atrial fibrillation  Assessment and Plan of Treatment:

## 2022-08-01 NOTE — DISCHARGE NOTE NURSING/CASE MANAGEMENT/SOCIAL WORK - PATIENT PORTAL LINK FT
You can access the FollowMyHealth Patient Portal offered by [facility] by registering at the following website: [email or web address]. By joining [facility]’s FollowMyHealth portal, you will also be able to view your health information using other applications (apps) compatible with our system.

## 2022-08-01 NOTE — DISCHARGE NOTE NURSING/CASE MANAGEMENT/SOCIAL WORK - NSDCPEFALRISK_GEN_ALL_CORE
For information on Fall & Injury Prevention, visit: [email or web address] OR  [email or web address] OR  https://www.cdc.gov/steadi/patient.html

## 2022-08-01 NOTE — DISCHARGE NOTE PROVIDER - PROVIDER TOKENS
FREE:[LAST:[Manuel],FIRST:[Azam],PHONE:[(   )    -],FAX:[(   )    -],FOLLOWUP:[2 weeks]] FREE:[LAST:[Manuel],FIRST:[Ebrahim],PHONE:[(   )    -],FAX:[(   )    -],FOLLOWUP:[2 weeks]],PROVIDER:[TOKEN:[[id number]],FOLLOWUP:[2 weeks]]

## 2022-08-01 NOTE — DISCHARGE NOTE PROVIDER - NSDCPNSUBOBJ_GEN_ALL_CORE
Patient is a 76y old  Female who presents with a chief complaint of chest pain (01 Aug 2022 13:10)          Allergies    No Known Allergies    Intolerances        Medications:  ALBUTerol    90 MICROgram(s) HFA Inhaler 2 Puff(s) Inhalation every 6 hours PRN  atorvastatin 10 milliGRAM(s) Oral at bedtime  diltiazem    milliGRAM(s) Oral daily  folic acid 1 milliGRAM(s) Oral daily  latanoprost 0.005% Ophthalmic Solution 1 Drop(s) Both EYES at bedtime  levothyroxine 88 MICROGram(s) Oral daily  losartan 50 milliGRAM(s) Oral daily      Vitals:  T(C): 36.7 (08-01-22 @ 18:15), Max: 36.8 (08-01-22 @ 08:00)  HR: 56 (08-01-22 @ 19:15) (52 - 83)  BP: 115/74 (08-01-22 @ 18:15) (115/74 - 188/73)  BP(mean): 85 (08-01-22 @ 18:15) (85 - 99)  RR: 17 (08-01-22 @ 18:15) (16 - 17)  SpO2: 99% (08-01-22 @ 18:15) (94% - 100%)  Wt(kg): --  Daily Height in cm: 167.64 (01 Aug 2022 09:21)    Daily   I&O's Summary    01 Aug 2022 07:01  -  02 Aug 2022 03:31  --------------------------------------------------------  IN: 250 mL / OUT: 0 mL / NET: 250 mL          Physical Exam:  Appearance: Normal  Eyes: PERRL, EOMI  HENT: Normal oral mus[place]a, NC/AT  Cardiovascular: S1S2, RRR, No M/R/G, no JVD, No Lower extremity edema  Procedural Access Site: No hematoma, Non-tender to palpation, 2+ pulse, No bruit, No Ecchymosis  Respiratory: Clear to auscultation bilaterally  Gastrointestinal: Soft, Non tender, Normal Bowel Sounds  Musculoskeletal: No clubbing, No joint deformity   Neurologic: Non-focal  Lymphatic: No lymphadenopathy  Psychiatry: AAOx3, Mood & affect appropriate  Skin: No rashes, No ecchymoses, No cyanosis    08-01    141  |  106  |  16  ----------------------------<  127<H>  4.0   |  26  |  0.82    Ca    9.1      01 Aug 2022 07:15  Phos  3.0     07-31  Mg     1.9     08-01    TPro  6.7  /  Alb  3.0<L>  /  TBili  0.5  /  DBili  x   /  AST  20  /  ALT  18  /  AlkPhos  78  07-31            Lipid panel   Hgb A1c                         13.2   6.12  )-----------( 182      ( 01 Aug 2022 07:15 )             40.0         ECG: SB 58 bpm    Cath: Monitor right groin site for swelling, bleeding      Atrial fibrillation: Serial EKG  Telemetry monitoring  Continue Eliquis    HLD: continue statin, confirm lipid panel results     Imaging:    Interpretation of Telemetry:   Patient is a 76y old  Female who presents with a chief complaint of chest pain (01 Aug 2022 13:10)          Allergies    No Known Allergies    Intolerances        Medications:  ALBUTerol    90 MICROgram(s) HFA Inhaler 2 Puff(s) Inhalation every 6 hours PRN  atorvastatin 10 milliGRAM(s) Oral at bedtime  diltiazem    milliGRAM(s) Oral daily  folic acid 1 milliGRAM(s) Oral daily  latanoprost 0.005% Ophthalmic Solution 1 Drop(s) Both EYES at bedtime  levothyroxine 88 MICROGram(s) Oral daily  losartan 50 milliGRAM(s) Oral daily      Vitals:  T(C): 36.7 (08-01-22 @ 18:15), Max: 36.8 (08-01-22 @ 08:00)  HR: 56 (08-01-22 @ 19:15) (52 - 83)  BP: 115/74 (08-01-22 @ 18:15) (115/74 - 188/73)  BP(mean): 85 (08-01-22 @ 18:15) (85 - 99)  RR: 17 (08-01-22 @ 18:15) (16 - 17)  SpO2: 99% (08-01-22 @ 18:15) (94% - 100%)  Wt(kg): --  Daily Height in cm: 167.64 (01 Aug 2022 09:21)    Daily   I&O's Summary    01 Aug 2022 07:01  -  02 Aug 2022 03:31  --------------------------------------------------------  IN: 250 mL / OUT: 0 mL / NET: 250 mL          Physical Exam:  Appearance: Normal  Eyes: PERRL, EOMI  HENT: Normal oral mus[place]a, NC/AT  Cardiovascular: S1S2, RRR, No M/R/G, no JVD, No Lower extremity edema  Procedural Access Site: No hematoma, Non-tender to palpation, 2+ pulse, No bruit, No Ecchymosis  Respiratory: Clear to auscultation bilaterally  Gastrointestinal: Soft, Non tender, Normal Bowel Sounds  Musculoskeletal: No clubbing, No joint deformity   Neurologic: Non-focal  Lymphatic: No lymphadenopathy  Psychiatry: AAOx3, Mood & affect appropriate  Skin: No rashes, No ecchymoses, No cyanosis  Ext: Right groin no hematoma or bleeding, DP pulse 2+/2+     08-01    141  |  106  |  16  ----------------------------<  127<H>  4.0   |  26  |  0.82    Ca    9.1      01 Aug 2022 07:15  Phos  3.0     07-31  Mg     1.9     08-01    TPro  6.7  /  Alb  3.0<L>  /  TBili  0.5  /  DBili  x   /  AST  20  /  ALT  18  /  AlkPhos  78  07-31            Lipid panel   Hgb A1c                         13.2   6.12  )-----------( 182      ( 01 Aug 2022 07:15 )             40.0         ECG: SB 58 bpm    Cath: Monitor right groin site for swelling, bleeding      Atrial fibrillation: Serial EKG  Telemetry monitoring  Continue Eliquis    HLD: continue statin, confirm lipid panel results     Imaging: VA LE DUPLEX - no DVT     Interpretation of Telemetry:

## 2022-08-01 NOTE — DISCHARGE NOTE NURSING/CASE MANAGEMENT/SOCIAL WORK - PATIENT PORTAL LINK FT
You can access the FollowMyHealth Patient Portal offered by [facility] by registering at the following website: [email or web address]. By joining [employer]’s FollowMyHealth portal, you will also be able to view your health information using other applications (apps) compatible with our system.

## 2022-08-01 NOTE — DISCHARGE NOTE PROVIDER - HOSPITAL COURSE
Hospital Course  HPI:  77 yo woman with history of Hypertension, Right Carotid Artery Stenosis s/p stent, Asthma, Hypothyroidism, and Glaucoma comes to ED c/o chest pain. Patient said she went back to sleep around 5am when she suddenly woke up with left sided chest pain 9/10. Patient states its difficult to describe. States it was sharp but also pressure like. She has never had pain like this before. She says her left arm feels slightly numb but now at baseline. SHe reports feeling the left ear as warm and a left side headache. She denies chest pain currently, sob, palpitations, nausea, vomiting, fever, or chills    While in the ED, HR was elevated and noted to be in a. fib on EKG. Patient has no reported history of a. fib as per son and daughter at bedside. (30 Jul 2022 11:43)        Patient admitted to telemetry.  Her heart rate stabilized with cardizem.  Cardiology consulted.  Angiogram recommended  Stable for cardiac catheterization at this time

## 2022-08-01 NOTE — PROGRESS NOTE ADULT - SUBJECTIVE AND OBJECTIVE BOX
Patient is a 76y old  Female who presents with a chief complaint of chest pain (01 Aug 2022 07:39)      Patient seen and examined at bedside.    ALLERGIES:  No Known Allergies    MEDICATIONS  (STANDING):  aspirin  chewable 81 milliGRAM(s) Oral daily  atorvastatin 40 milliGRAM(s) Oral at bedtime  diltiazem    Tablet 30 milliGRAM(s) Oral every 6 hours  folic acid 1 milliGRAM(s) Oral daily  latanoprost 0.005% Ophthalmic Solution 1 Drop(s) Both EYES at bedtime  levothyroxine 88 MICROGram(s) Oral daily  losartan 100 milliGRAM(s) Oral daily    MEDICATIONS  (PRN):  acetaminophen     Tablet .. 650 milliGRAM(s) Oral every 6 hours PRN Temp greater or equal to 38C (100.4F), Mild Pain (1 - 3)  ALBUTerol    90 MICROgram(s) HFA Inhaler 2 Puff(s) Inhalation every 6 hours PRN Shortness of Breath and/or Wheezing  melatonin 3 milliGRAM(s) Oral at bedtime PRN Insomnia    Vital Signs Last 24 Hrs  T(F): 98.1 (01 Aug 2022 06:14), Max: 98.1 (01 Aug 2022 06:14)  HR: 61 (01 Aug 2022 06:14) (54 - 63)  BP: 178/78 (01 Aug 2022 06:14) (161/70 - 184/73)  RR: 16 (01 Aug 2022 06:14) (16 - 18)  SpO2: 98% (01 Aug 2022 06:14) (96% - 98%)  I&O's Summary    2022 07:01  -  01 Aug 2022 07:00  --------------------------------------------------------  IN: 200 mL / OUT: 0 mL / NET: 200 mL        PHYSICAL EXAM:  General: NAD, A/O x 3  ENT: MMM, no scleral icterus  Neck: Supple, No JVD, no thyroidomegaly  Lungs: Clear to auscultation bilaterally, no wheezes, no rales, no rhonchi, good inspiratory effort  Cardio: RRR, S1/S2, No murmurs  Abdomen: Soft, Nontender, Nondistended; Bowel sounds present  Extremities: No calf tenderness, No pitting edema, no skin changes    LABS:                        12.3   5.69  )-----------( 171      ( 2022 07:40 )             37.0       07-31    140  |  107  |  14  ----------------------------<  127  4.0   |  25  |  0.79    Ca    8.9      2022 07:40  Phos  3.0     07-  Mg     2.1     07-31    TPro  6.7  /  Alb  3.0  /  TBili  0.5  /  DBili  x   /  AST  20  /  ALT  18  /  AlkPhos  78  07-31       PT/INR - ( 2022 09:50 )   PT: 10.5 sec;   INR: 0.91 ratio         PTT - ( 2022 19:10 )  PTT:>200.0 sec     CARDIAC MARKERS ( 2022 07:40 )  x     / 250.5 ng/L / x     / x     / x      CARDIAC MARKERS ( 2022 20:30 )  x     / 529.7 ng/L / x     / x     / x      CARDIAC MARKERS ( 2022 14:20 )  x     / 428.2 ng/L / x     / x     / x      CARDIAC MARKERS ( 2022 09:50 )  x     / 68.3 ng/L / x     / x     / x          07-31 Chol 132 mg/dL LDL -- HDL 53 mg/dL Trig 165 mg/dL  TSH 0.116   TSH with FT4 reflex --  Total T3 --                  Urinalysis Basic - ( 2022 14:20 )    Color: Yellow / Appearance: Clear / S.010 / pH: x  Gluc: x / Ketone: Negative  / Bili: Negative / Urobili: Negative   Blood: x / Protein: Negative / Nitrite: Negative   Leuk Esterase: Moderate / RBC: 0-4 /HPF / WBC 0-2 /HPF   Sq Epi: x / Non Sq Epi: Moderate / Bacteria: Negative /HPF        COVID-19 PCR: NotDetec (22 @ 09:50)    COVID-19 PCR: NotDetec (22 @ 09:50)        RADIOLOGY & ADDITIONAL TESTS:  Care Discussed with Consultants/Other Providers:    Patient is a 76y old  Female who presents with a chief complaint of chest pain (01 Aug 2022 07:39)    No events overnight  For transfer for cardiac cath this AM  Patient seen and examined at bedside.    ALLERGIES:  No Known Allergies    MEDICATIONS  (STANDING):  aspirin  chewable 81 milliGRAM(s) Oral daily  atorvastatin 40 milliGRAM(s) Oral at bedtime  diltiazem    Tablet 30 milliGRAM(s) Oral every 6 hours  folic acid 1 milliGRAM(s) Oral daily  latanoprost 0.005% Ophthalmic Solution 1 Drop(s) Both EYES at bedtime  levothyroxine 88 MICROGram(s) Oral daily  losartan 100 milliGRAM(s) Oral daily    MEDICATIONS  (PRN):  acetaminophen     Tablet .. 650 milliGRAM(s) Oral every 6 hours PRN Temp greater or equal to 38C (100.4F), Mild Pain (1 - 3)  ALBUTerol    90 MICROgram(s) HFA Inhaler 2 Puff(s) Inhalation every 6 hours PRN Shortness of Breath and/or Wheezing  melatonin 3 milliGRAM(s) Oral at bedtime PRN Insomnia    Vital Signs Last 24 Hrs  T(F): 98.1 (01 Aug 2022 06:14), Max: 98.1 (01 Aug 2022 06:14)  HR: 61 (01 Aug 2022 06:14) (54 - 63)  BP: 178/78 (01 Aug 2022 06:14) (161/70 - 184/73)  RR: 16 (01 Aug 2022 06:14) (16 - 18)  SpO2: 98% (01 Aug 2022 06:14) (96% - 98%)  I&O's Summary    2022 07:01  -  01 Aug 2022 07:00  --------------------------------------------------------  IN: 200 mL / OUT: 0 mL / NET: 200 mL        PHYSICAL EXAM:  General: NAD, A/O x 3  ENT: MMM, no scleral icterus  Neck: Supple, No JVD, no thyroidomegaly  Lungs: Clear to auscultation bilaterally, no wheezes, no rales, no rhonchi, good inspiratory effort  Cardio: RRR, S1/S2, No murmurs  Abdomen: Soft, Nontender, Nondistended; Bowel sounds present  Extremities: No calf tenderness, No pitting edema, no skin changes    LABS:                        12.3   5.69  )-----------( 171      ( 2022 07:40 )             37.0       07-31    140  |  107  |  14  ----------------------------<  127  4.0   |  25  |  0.79    Ca    8.9      2022 07:40  Phos  3.0     07-31  Mg     2.1     07-31    TPro  6.7  /  Alb  3.0  /  TBili  0.5  /  DBili  x   /  AST  20  /  ALT  18  /  AlkPhos  78  07-31       PT/INR - ( 2022 09:50 )   PT: 10.5 sec;   INR: 0.91 ratio         PTT - ( 2022 19:10 )  PTT:>200.0 sec     CARDIAC MARKERS ( 2022 07:40 )  x     / 250.5 ng/L / x     / x     / x      CARDIAC MARKERS ( 2022 20:30 )  x     / 529.7 ng/L / x     / x     / x      CARDIAC MARKERS ( 2022 14:20 )  x     / 428.2 ng/L / x     / x     / x      CARDIAC MARKERS ( 2022 09:50 )  x     / 68.3 ng/L / x     / x     / x          07-31 Chol 132 mg/dL LDL -- HDL 53 mg/dL Trig 165 mg/dL  TSH 0.116   TSH with FT4 reflex --  Total T3 --    Urinalysis Basic - ( 2022 14:20 )    Color: Yellow / Appearance: Clear / S.010 / pH: x  Gluc: x / Ketone: Negative  / Bili: Negative / Urobili: Negative   Blood: x / Protein: Negative / Nitrite: Negative   Leuk Esterase: Moderate / RBC: 0-4 /HPF / WBC 0-2 /HPF   Sq Epi: x / Non Sq Epi: Moderate / Bacteria: Negative /HPF    COVID-19 PCR: NotDetec (22 @ 09:50)  COVID-19 PCR: NotDetec (22 @ 09:50)        Care Discussed with Consultants/Other Providers:   Cardiology

## 2022-08-01 NOTE — DISCHARGE NOTE PROVIDER - NSDCCPTREATMENT_GEN_ALL_CORE_FT
PRINCIPAL PROCEDURE  Procedure: Left heart cardiac cath  Findings and Treatment: normal coronaries

## 2022-08-01 NOTE — DISCHARGE NOTE PROVIDER - CARE PROVIDER_API CALL
Azam Jackson  Phone: (   )    -  Fax: (   )    -  Follow Up Time: 2 weeks   Azam Jackson  Phone: (   )    -  Fax: (   )    -  Follow Up Time: 2 weeks    Alek Ley)  Cardiology; Internal Medicine  [address], [address]  [place] [address]  Phone: [phone]  Fax: [phone]  Follow Up Time: 2 weeks

## 2022-08-01 NOTE — PATIENT PROFILE ADULT - FALL HARM RISK - UNIVERSAL INTERVENTIONS
Bed in lowest position, wheels locked, appropriate side rails in place/Call bell, personal items and telephone in reach/Instruct patient to call for assistance before getting out of bed or chair/Non-slip footwear when patient is out of bed/[place] to call system/Physically safe environment - no spills, clutter or unnecessary equipment/Purposeful Proactive Rounding/Room/bathroom lighting operational, light cord in reach

## 2022-08-01 NOTE — H&P CARDIOLOGY - PATIENT'S PREFERRED PRONOUN
Pt arrived from PACU via stretcher. Condition stable. No distress noted. Pt oriented to room and unit. Plan of care and orders reviewed. CBI infusing via 3way hernandez cath, patent with no difficulties noted. Her/She

## 2022-08-01 NOTE — DISCHARGE NOTE NURSING/CASE MANAGEMENT/SOCIAL WORK - NSDCPEFALRISK_GEN_ALL_CORE
For information on Fall & Injury Prevention, visit: [email or web address] OR  [email or web address] OR  https://www.cdc.gov/steadi/patient.html

## 2022-08-01 NOTE — DISCHARGE NOTE PROVIDER - NSDCMRMEDTOKEN_GEN_ALL_CORE_FT
aspirin 81 mg oral tablet, chewable: 1 tab(s) orally once a day HOME HOSP  atorvastatin 10 mg oral tablet: 1 tab(s) orally once a day HOME  Cardizem  mg/24 hours oral capsule, extended release: 1 cap(s) orally once a day MDD:1  Eliquis 5 mg oral tablet: 1 tab(s) orally 2 times a day MDD:2 tabs  Please start 8/2 evening  folic acid 1 mg oral tablet: 1 tab(s) orally once a day HOME HOSP  irbesartan 300 mg oral tablet: 1 tab(s) orally once a day  latanoprost 0.005% ophthalmic solution: 1 drop(s) to each affected eye once a day (at bedtime) HOME HOSP  levothyroxine 88 mcg (0.088 mg) oral tablet: 1 tab(s) orally once a day HOME HOSP (DOSE WAS JUST DECREASED PER SON)  ProAir HFA 90 mcg/inh inhalation aerosol: 2 puff(s) inhaled every 6 hours HOME   atorvastatin 10 mg oral tablet: 1 tab(s) orally once a day HOME  Cardizem  mg/24 hours oral capsule, extended release: 1 cap(s) orally once a day MDD:1  Eliquis 5 mg oral tablet: 1 tab(s) orally 2 times a day MDD:2 tabs  Please start 8/2 evening  folic acid 1 mg oral tablet: 1 tab(s) orally once a day HOME HOSP  irbesartan 300 mg oral tablet: 1 tab(s) orally once a day  latanoprost 0.005% ophthalmic solution: 1 drop(s) to each affected eye once a day (at bedtime) HOME HOSP  levothyroxine 88 mcg (0.088 mg) oral tablet: 1 tab(s) orally once a day HOME HOSP (DOSE WAS JUST DECREASED PER SON)  ProAir HFA 90 mcg/inh inhalation aerosol: 2 puff(s) inhaled every 6 hours HOME   atorvastatin 10 mg oral tablet: 1 tab(s) orally once a day HOME  Cardizem  mg/24 hours oral capsule, extended release: 1 cap(s) orally once a day MDD:1  Eliquis 5 mg oral tablet: 1 tab(s) orally 2 times a day MDD:2 tabs  Please start 8/2 evening  folic acid 1 mg oral tablet: 1 tab(s) orally once a day HOME HOSP  latanoprost 0.005% ophthalmic solution: 1 drop(s) to each affected eye once a day (at bedtime) HOME HOSP  levothyroxine 88 mcg (0.088 mg) oral tablet: 1 tab(s) orally once a day HOME HOSP (DOSE WAS JUST DECREASED PER SON)  ProAir HFA 90 mcg/inh inhalation aerosol: 2 puff(s) inhaled every 6 hours HOME   atorvastatin 10 mg oral tablet: 1 tab(s) orally once a day HOME  Cardizem  mg/24 hours oral capsule, extended release: 1 cap(s) orally once a day MDD:1  Eliquis 5 mg oral tablet: 1 tab(s) orally 2 times a day MDD:2 tabs  Please start 8/2 evening  folic acid 1 mg oral tablet: 1 tab(s) orally once a day HOME HOSP  irbesartan 150 mg oral tablet: 1 tab(s) orally once a day   latanoprost 0.005% ophthalmic solution: 1 drop(s) to each affected eye once a day (at bedtime) HOME HOSP  levothyroxine 88 mcg (0.088 mg) oral tablet: 1 tab(s) orally once a day HOME HOSP (DOSE WAS JUST DECREASED PER SON)  ProAir HFA 90 mcg/inh inhalation aerosol: 2 puff(s) inhaled every 6 hours HOME

## 2022-08-02 VITALS
OXYGEN SATURATION: 95 % | SYSTOLIC BLOOD PRESSURE: 168 MMHG | RESPIRATION RATE: 17 BRPM | DIASTOLIC BLOOD PRESSURE: 58 MMHG | HEART RATE: 58 BPM | TEMPERATURE: 98 F

## 2022-08-02 LAB
HCT VFR BLD CALC: 39.9 % — SIGNIFICANT CHANGE UP (ref 34.5–45)
HGB BLD-MCNC: 13.1 G/DL — SIGNIFICANT CHANGE UP (ref 11.5–15.5)
MCHC RBC-ENTMCNC: 28.7 PG — SIGNIFICANT CHANGE UP (ref 27–34)
MCHC RBC-ENTMCNC: 32.8 GM/DL — SIGNIFICANT CHANGE UP (ref 32–36)
MCV RBC AUTO: 87.3 FL — SIGNIFICANT CHANGE UP (ref 80–100)
NRBC # BLD: 0 /100 WBCS — SIGNIFICANT CHANGE UP (ref 0–0)
PLATELET # BLD AUTO: 181 K/UL — SIGNIFICANT CHANGE UP (ref 150–400)
RBC # BLD: 4.57 M/UL — SIGNIFICANT CHANGE UP (ref 3.8–5.2)
RBC # FLD: 12.8 % — SIGNIFICANT CHANGE UP (ref 10.3–14.5)
WBC # BLD: 9.15 K/UL — SIGNIFICANT CHANGE UP (ref 3.8–10.5)
WBC # FLD AUTO: 9.15 K/UL — SIGNIFICANT CHANGE UP (ref 3.8–10.5)

## 2022-08-02 PROCEDURE — C1887: CPT

## 2022-08-02 PROCEDURE — 36415 COLL VENOUS BLD VENIPUNCTURE: CPT

## 2022-08-02 PROCEDURE — 93970 EXTREMITY STUDY: CPT

## 2022-08-02 PROCEDURE — 93458 L HRT ARTERY/VENTRICLE ANGIO: CPT

## 2022-08-02 PROCEDURE — 93005 ELECTROCARDIOGRAM TRACING: CPT

## 2022-08-02 PROCEDURE — 85027 COMPLETE CBC AUTOMATED: CPT

## 2022-08-02 PROCEDURE — C1769: CPT

## 2022-08-02 PROCEDURE — 93970 EXTREMITY STUDY: CPT | Mod: 26

## 2022-08-02 PROCEDURE — C1894: CPT

## 2022-08-02 PROCEDURE — 85379 FIBRIN DEGRADATION QUANT: CPT

## 2022-08-02 RX ORDER — ACETAMINOPHEN 500 MG
650 TABLET ORAL ONCE
Refills: 0 | Status: COMPLETED | OUTPATIENT
Start: 2022-08-02 | End: 2022-08-02

## 2022-08-02 RX ORDER — IRBESARTAN 75 MG/1
1 TABLET ORAL
Qty: 30 | Refills: 0
Start: 2022-08-02

## 2022-08-02 RX ORDER — ACETAMINOPHEN 500 MG
650 TABLET ORAL EVERY 6 HOURS
Refills: 0 | Status: DISCONTINUED | OUTPATIENT
Start: 2022-08-02 | End: 2022-08-02

## 2022-08-02 RX ADMIN — Medication 650 MILLIGRAM(S): at 11:53

## 2022-08-02 RX ADMIN — Medication 650 MILLIGRAM(S): at 13:30

## 2022-08-02 RX ADMIN — Medication 120 MILLIGRAM(S): at 06:30

## 2022-08-02 RX ADMIN — Medication 650 MILLIGRAM(S): at 04:35

## 2022-08-02 RX ADMIN — Medication 650 MILLIGRAM(S): at 04:05

## 2022-08-02 RX ADMIN — Medication 88 MICROGRAM(S): at 06:30

## 2022-08-02 RX ADMIN — LOSARTAN POTASSIUM 50 MILLIGRAM(S): 100 TABLET, FILM COATED ORAL at 06:30

## 2022-08-02 RX ADMIN — Medication 1 MILLIGRAM(S): at 11:53

## 2022-08-03 PROBLEM — J45.909 UNSPECIFIED ASTHMA, UNCOMPLICATED: Chronic | Status: ACTIVE | Noted: 2022-08-01

## 2022-08-03 PROBLEM — I10 ESSENTIAL (PRIMARY) HYPERTENSION: Chronic | Status: ACTIVE | Noted: 2022-08-01

## 2022-08-03 PROBLEM — H40.9 UNSPECIFIED GLAUCOMA: Chronic | Status: ACTIVE | Noted: 2022-08-01

## 2022-08-03 PROBLEM — I77.9 DISORDER OF ARTERIES AND ARTERIOLES, UNSPECIFIED: Chronic | Status: ACTIVE | Noted: 2022-08-01

## 2022-08-03 PROBLEM — I48.91 UNSPECIFIED ATRIAL FIBRILLATION: Chronic | Status: ACTIVE | Noted: 2022-08-01

## 2022-08-03 PROBLEM — I77.1 STRICTURE OF ARTERY: Chronic | Status: ACTIVE | Noted: 2022-08-01

## 2022-08-05 ENCOUNTER — APPOINTMENT (OUTPATIENT)
Dept: CARE COORDINATION | Facility: HOME HEALTH | Age: 77
End: 2022-08-05

## 2022-08-05 DIAGNOSIS — E03.9 HYPOTHYROIDISM, UNSPECIFIED: ICD-10-CM

## 2022-08-05 DIAGNOSIS — I48.91 UNSPECIFIED ATRIAL FIBRILLATION: ICD-10-CM

## 2022-08-05 DIAGNOSIS — I21.4 NON-ST ELEVATION (NSTEMI) MYOCARDIAL INFARCTION: ICD-10-CM

## 2022-08-05 DIAGNOSIS — I10 ESSENTIAL (PRIMARY) HYPERTENSION: ICD-10-CM

## 2022-08-05 PROBLEM — Z00.00 ENCOUNTER FOR PREVENTIVE HEALTH EXAMINATION: Status: ACTIVE | Noted: 2022-08-05

## 2022-08-05 PROCEDURE — 99496 TRANSJ CARE MGMT HIGH F2F 7D: CPT | Mod: 95

## 2022-08-06 VITALS — SYSTOLIC BLOOD PRESSURE: 172 MMHG | HEART RATE: 62 BPM | DIASTOLIC BLOOD PRESSURE: 76 MMHG

## 2022-08-06 PROBLEM — I21.4 NSTEMI, INITIAL EPISODE OF CARE: Status: ACTIVE | Noted: 2022-08-06

## 2022-08-06 PROBLEM — I10 HYPERTENSION: Status: ACTIVE | Noted: 2022-08-06

## 2022-08-06 PROBLEM — I48.91 ATRIAL FIBRILLATION: Status: ACTIVE | Noted: 2022-08-06

## 2022-08-06 PROBLEM — E03.9 HYPOTHYROID: Status: ACTIVE | Noted: 2022-08-06

## 2022-08-06 RX ORDER — ALBUTEROL SULFATE 90 UG/1
108 (90 BASE) AEROSOL, METERED RESPIRATORY (INHALATION) EVERY 6 HOURS
Refills: 0 | Status: ACTIVE | COMMUNITY
Start: 2022-08-06

## 2022-08-06 RX ORDER — LEVOTHYROXINE SODIUM 88 UG/1
88 TABLET ORAL DAILY
Refills: 0 | Status: ACTIVE | COMMUNITY
Start: 2022-08-06

## 2022-08-06 RX ORDER — APIXABAN 5 MG/1
5 TABLET, FILM COATED ORAL
Qty: 60 | Refills: 5 | Status: ACTIVE | COMMUNITY
Start: 2022-08-06

## 2022-08-06 RX ORDER — LATANOPROST/PF 0.005 %
0.01 DROPS OPHTHALMIC (EYE) DAILY
Refills: 0 | Status: ACTIVE | COMMUNITY
Start: 2022-08-06

## 2022-08-06 RX ORDER — IRBESARTAN 75 MG/1
75 TABLET ORAL AT BEDTIME
Refills: 0 | Status: ACTIVE | COMMUNITY
Start: 2022-08-06

## 2022-08-06 RX ORDER — DILTIAZEM HYDROCHLORIDE 120 MG/1
120 CAPSULE, COATED, EXTENDED RELEASE ORAL DAILY
Refills: 0 | Status: ACTIVE | COMMUNITY
Start: 2022-08-06

## 2022-08-06 RX ORDER — ATORVASTATIN CALCIUM 10 MG/1
10 TABLET, FILM COATED ORAL
Refills: 0 | Status: ACTIVE | COMMUNITY
Start: 2022-08-06

## 2022-08-06 RX ORDER — FOLIC ACID 1 MG/1
1 TABLET ORAL DAILY
Refills: 0 | Status: ACTIVE | COMMUNITY
Start: 2022-08-06

## 2022-08-06 NOTE — PHYSICAL EXAM
[No Acute Distress] : no acute distress [No Respiratory Distress] : no respiratory distress  [de-identified] : per TH

## 2022-08-06 NOTE — HISTORY OF PRESENT ILLNESS
[Post-hospitalization from ___ Hospital] : Post-hospitalization from [unfilled] Hospital [Home] : at home, [unfilled] , at the time of the visit. [Other Location: e.g. Home (Enter Location, City,State)___] : at [unfilled] [Family Member] : family member [Verbal consent obtained from patient] : the patient, [unfilled] [Admitted on: ___] : The patient was admitted on [unfilled] [Discharged on ___] : discharged on [unfilled] [Discharge Summary] : discharge summary [Discharge Med List] : discharge medication list [Med Reconciliation] : medication reconciliation has been completed [Patient Contacted By: ____] : and contacted by [unfilled] [FreeTextEntry2] : 77 y/o woman, moved to  from [place] 1 week ago, with history of Hypertension, Right Subclavian Artery Stenosis s/p stent, Asthma, Hypothyroidism, and Glaucoma, reported normal cath 8 yrs ago with minimal disease, presented to [place] ED on 7/30/22 to ED c/o chest pain. Patient said she went back to sleep around 5am when she suddenly woke up with left sided chest pain/pressure 9/10. Pt was noted to have new onset AFib in ED. Troponins were elevated 250<-529<-428<-68. Beta blockers were avoided per PMD due to reactive airway disease. TTE showed EF 55-60%; grade II diastolic dysfunction. Transferred to [facility] for further evaluation. \par \par Since dc, she denies any CP/SOB/PND/orthopnea.  She does state she has occasional palpitations, but no sustained beats or any that takes her breath away.  While hospitalized her TSH was 0.116 and her synthroid was decreased to 88mcg.  I reiterated the necessity that she has her TSH redrawn in 3-4 weeks to further titrate medication if necessary, verbalized understanding.  She is currently on Cardizem CD (new med), and her home dose  irbesartan was reduced to 150mg daily from 300mg due to an episode of orthostatic hypotension while hospitalized.  She continues to be hypertensive upon awakening into the 170s- asymptomatic.  Discussed medication changes with her cardio, who is also a family member, and he instructed me to have her take Irbesartan 150mg in the AM and 75 mg in the PM.  Continue with current dose of cardizem because of the HR being borderline.  Called the patient and son's respective phones and left voicemails with detailed instructions- TCM RN will f/u on Monday.  Referral for HC RN placed per patient's request and HC will reach out to Dr. Jackson for authorization.  TCM numbers provided for any questions/concerns

## 2023-02-01 ENCOUNTER — NON-APPOINTMENT (OUTPATIENT)
Age: 78
End: 2023-02-01

## 2023-02-26 ENCOUNTER — NON-APPOINTMENT (OUTPATIENT)
Age: 78
End: 2023-02-26

## 2023-03-03 ENCOUNTER — EMERGENCY (EMERGENCY)
Facility: HOSPITAL | Age: 78
LOS: 1 days | Discharge: HOME | End: 2023-03-03
Attending: EMERGENCY MEDICINE
Payer: MEDICARE

## 2023-03-03 VITALS
RESPIRATION RATE: 20 BRPM | HEART RATE: 75 BPM | DIASTOLIC BLOOD PRESSURE: 66 MMHG | TEMPERATURE: 98 F | SYSTOLIC BLOOD PRESSURE: 160 MMHG | OXYGEN SATURATION: 95 %

## 2023-03-03 VITALS
HEIGHT: 66 IN | SYSTOLIC BLOOD PRESSURE: 159 MMHG | DIASTOLIC BLOOD PRESSURE: 81 MMHG | RESPIRATION RATE: 20 BRPM | OXYGEN SATURATION: 96 % | HEART RATE: 79 BPM | TEMPERATURE: 98 F | WEIGHT: 156.09 LBS

## 2023-03-03 DIAGNOSIS — Z90.710 ACQUIRED ABSENCE OF BOTH CERVIX AND UTERUS: Chronic | ICD-10-CM

## 2023-03-03 DIAGNOSIS — Z90.49 ACQUIRED ABSENCE OF OTHER SPECIFIED PARTS OF DIGESTIVE TRACT: Chronic | ICD-10-CM

## 2023-03-03 DIAGNOSIS — Z98.890 OTHER SPECIFIED POSTPROCEDURAL STATES: Chronic | ICD-10-CM

## 2023-03-03 LAB
ALBUMIN SERPL ELPH-MCNC: 4 G/DL — SIGNIFICANT CHANGE UP (ref 3.3–5)
ALP SERPL-CCNC: 102 U/L — SIGNIFICANT CHANGE UP (ref 40–120)
ALT FLD-CCNC: 21 U/L — SIGNIFICANT CHANGE UP (ref 10–45)
ANION GAP SERPL CALC-SCNC: 12 MMOL/L — SIGNIFICANT CHANGE UP (ref 5–17)
AST SERPL-CCNC: 33 U/L — SIGNIFICANT CHANGE UP (ref 10–40)
BASOPHILS # BLD AUTO: 0.01 K/UL — SIGNIFICANT CHANGE UP (ref 0–0.2)
BASOPHILS NFR BLD AUTO: 0.2 % — SIGNIFICANT CHANGE UP (ref 0–2)
BILIRUB SERPL-MCNC: 0.5 MG/DL — SIGNIFICANT CHANGE UP (ref 0.2–1.2)
BUN SERPL-MCNC: 12 MG/DL — SIGNIFICANT CHANGE UP (ref 7–23)
CALCIUM SERPL-MCNC: 9.6 MG/DL — SIGNIFICANT CHANGE UP (ref 8.4–10.5)
CHLORIDE SERPL-SCNC: 104 MMOL/L — SIGNIFICANT CHANGE UP (ref 96–108)
CO2 SERPL-SCNC: 22 MMOL/L — SIGNIFICANT CHANGE UP (ref 22–31)
CREAT SERPL-MCNC: 0.8 MG/DL — SIGNIFICANT CHANGE UP (ref 0.5–1.3)
EGFR: 77 ML/MIN/1.73M2 — SIGNIFICANT CHANGE UP
EOSINOPHIL # BLD AUTO: 0.15 K/UL — SIGNIFICANT CHANGE UP (ref 0–0.5)
EOSINOPHIL NFR BLD AUTO: 2.6 % — SIGNIFICANT CHANGE UP (ref 0–6)
GLUCOSE SERPL-MCNC: 99 MG/DL — SIGNIFICANT CHANGE UP (ref 70–99)
HCT VFR BLD CALC: 38.3 % — SIGNIFICANT CHANGE UP (ref 34.5–45)
HGB BLD-MCNC: 11.8 G/DL — SIGNIFICANT CHANGE UP (ref 11.5–15.5)
HMPV RNA SPEC QL NAA+PROBE: DETECTED
IMM GRANULOCYTES NFR BLD AUTO: 0.3 % — SIGNIFICANT CHANGE UP (ref 0–0.9)
LYMPHOCYTES # BLD AUTO: 1.69 K/UL — SIGNIFICANT CHANGE UP (ref 1–3.3)
LYMPHOCYTES # BLD AUTO: 29.5 % — SIGNIFICANT CHANGE UP (ref 13–44)
MCHC RBC-ENTMCNC: 27.3 PG — SIGNIFICANT CHANGE UP (ref 27–34)
MCHC RBC-ENTMCNC: 30.8 GM/DL — LOW (ref 32–36)
MCV RBC AUTO: 88.5 FL — SIGNIFICANT CHANGE UP (ref 80–100)
MONOCYTES # BLD AUTO: 0.48 K/UL — SIGNIFICANT CHANGE UP (ref 0–0.9)
MONOCYTES NFR BLD AUTO: 8.4 % — SIGNIFICANT CHANGE UP (ref 2–14)
NEUTROPHILS # BLD AUTO: 3.37 K/UL — SIGNIFICANT CHANGE UP (ref 1.8–7.4)
NEUTROPHILS NFR BLD AUTO: 59 % — SIGNIFICANT CHANGE UP (ref 43–77)
NRBC # BLD: 0 /100 WBCS — SIGNIFICANT CHANGE UP (ref 0–0)
PLATELET # BLD AUTO: 164 K/UL — SIGNIFICANT CHANGE UP (ref 150–400)
POTASSIUM SERPL-MCNC: 4.5 MMOL/L — SIGNIFICANT CHANGE UP (ref 3.5–5.3)
POTASSIUM SERPL-SCNC: 4.5 MMOL/L — SIGNIFICANT CHANGE UP (ref 3.5–5.3)
PROT SERPL-MCNC: 7.3 G/DL — SIGNIFICANT CHANGE UP (ref 6–8.3)
RAPID RVP RESULT: DETECTED
RBC # BLD: 4.33 M/UL — SIGNIFICANT CHANGE UP (ref 3.8–5.2)
RBC # FLD: 13.8 % — SIGNIFICANT CHANGE UP (ref 10.3–14.5)
SARS-COV-2 RNA SPEC QL NAA+PROBE: SIGNIFICANT CHANGE UP
SODIUM SERPL-SCNC: 138 MMOL/L — SIGNIFICANT CHANGE UP (ref 135–145)
WBC # BLD: 5.72 K/UL — SIGNIFICANT CHANGE UP (ref 3.8–10.5)
WBC # FLD AUTO: 5.72 K/UL — SIGNIFICANT CHANGE UP (ref 3.8–10.5)

## 2023-03-03 PROCEDURE — 99284 EMERGENCY DEPT VISIT MOD MDM: CPT | Mod: FS

## 2023-03-03 PROCEDURE — 80053 COMPREHEN METABOLIC PANEL: CPT

## 2023-03-03 PROCEDURE — 99285 EMERGENCY DEPT VISIT HI MDM: CPT | Mod: 25

## 2023-03-03 PROCEDURE — 94640 AIRWAY INHALATION TREATMENT: CPT

## 2023-03-03 PROCEDURE — 85025 COMPLETE CBC W/AUTO DIFF WBC: CPT

## 2023-03-03 PROCEDURE — 0225U NFCT DS DNA&RNA 21 SARSCOV2: CPT

## 2023-03-03 PROCEDURE — 71046 X-RAY EXAM CHEST 2 VIEWS: CPT | Mod: 26

## 2023-03-03 PROCEDURE — 96374 THER/PROPH/DIAG INJ IV PUSH: CPT

## 2023-03-03 PROCEDURE — 93005 ELECTROCARDIOGRAM TRACING: CPT

## 2023-03-03 PROCEDURE — 71046 X-RAY EXAM CHEST 2 VIEWS: CPT

## 2023-03-03 RX ORDER — ACETAMINOPHEN 500 MG
975 TABLET ORAL ONCE
Refills: 0 | Status: COMPLETED | OUTPATIENT
Start: 2023-03-03 | End: 2023-03-03

## 2023-03-03 RX ORDER — IPRATROPIUM/ALBUTEROL SULFATE 18-103MCG
3 AEROSOL WITH ADAPTER (GRAM) INHALATION EVERY 6 HOURS
Refills: 0 | Status: DISCONTINUED | OUTPATIENT
Start: 2023-03-03 | End: 2023-03-06

## 2023-03-03 RX ORDER — HYDROCODONE BITARTRATE AND HOMATROPINE METHYLBROMIDE 5; 1.5 MG/5ML; MG/5ML
5 SOLUTION ORAL
Qty: 30 | Refills: 0
Start: 2023-03-03 | End: 2023-03-04

## 2023-03-03 RX ADMIN — Medication 975 MILLIGRAM(S): at 13:39

## 2023-03-03 RX ADMIN — Medication 125 MILLIGRAM(S): at 13:39

## 2023-03-03 RX ADMIN — Medication 975 MILLIGRAM(S): at 16:09

## 2023-03-03 RX ADMIN — Medication 3 MILLILITER(S): at 13:28

## 2023-03-03 NOTE — ED PROVIDER NOTE - PROGRESS NOTE DETAILS
Pt well appearing, states cough much improved since being medicated in ED. O2 sat 96% on RA, no desaturation or increased WOB with ambulation. To finish abx and inhalers she was already prescribed. Will send additional cough medication and script for correct steroid dosage. Pt agrees, would like to go home. Internal med referral placed. Patient stable for discharge.  Follow up instructions given, return to ED precautions reviewed. Importance of follow up emphasized, patient verbalized understanding.  All questions answered. Sukumar Dobbs PA-C

## 2023-03-03 NOTE — ED PROVIDER NOTE - NS ED ATTENDING STATEMENT MOD
This was a shared visit with the KHANG. I reviewed and verified the documentation and independently performed the documented:

## 2023-03-03 NOTE — ED PROVIDER NOTE - CLINICAL SUMMARY MEDICAL DECISION MAKING FREE TEXT BOX
Attending MD Serra: 78 yo F with a PMH of asthma (never intubated), CAD, Afib on Eliquis, HTN, HLD, hypothyroidism p/w non prod cough, wheezing, chills x 1 week. A/w chest tightness. Has not been sleeping 2/2 cough. Being tx by PMD for presumed asthma exac. Has been using albuterol/symbicort inhaler, benozonate, azithromycin x 3 days, prednisone 20mg x 3 days as well. Has not had any relief in sxs. Of note, grandchildren sick with cough and fever, tested pos for hmpv. Lungs with left sided wheezing.  Plan for CXR and increase steroid dose, antitussives, RVP and re-eval.

## 2023-03-03 NOTE — ED PROVIDER NOTE - PATIENT PORTAL LINK FT
You can access the FollowMyHealth Patient Portal offered by [facility] by registering at the following website: [email or web address]. By joining [employer]’s FollowMyHealth portal, you will also be able to view your health information using other applications (apps) compatible with our system.

## 2023-03-03 NOTE — ED PROVIDER NOTE - NSICDXPASTMEDICALHX_GEN_ALL_CORE_FT
PAST MEDICAL HISTORY:  Afib     Asthma     Carotid artery disease     Glaucoma     HLD (hyperlipidemia)     HTN (hypertension)     Hypothyroidism     Subclavian artery stenosis

## 2023-03-03 NOTE — ED PROVIDER NOTE - NSFOLLOWUPINSTRUCTIONS_ED_ALL_ED_FT
You were seen and evaluated in the ED for a cough.   Our referrals coordinator will call you with appointment details to see a primary care doctor. If you do not hear from anyone please call [phone].  Return to the ER as discussed if you develop any new or worsening symptoms.     Please finish the antibiotics that were prescribed to you.   Use your inhalers as directed.   A cough medication and steroid prescription were sent to your pharmacy.   Take Tylenol as needed for fever/pain.

## 2023-03-03 NOTE — ED ADULT NURSE NOTE - CAS TRG GEN SKIN CONDITION
Warm/Dry Duration Of Freeze Thaw-Cycle (Seconds): 3 Show Aperture Variable?: Yes Detail Level: Detailed Consent: The patient's consent was obtained including but not limited to risks of crusting, scabbing, blistering, scarring, darker or lighter pigmentary change, recurrence, incomplete removal and infection. Post-Care Instructions: I reviewed with the patient in detail post-care instructions. Patient is to wear sunprotection, and avoid picking at any of the treated lesions. Pt may apply Vaseline to crusted or scabbing areas. Number Of Freeze-Thaw Cycles: 1 freeze-thaw cycle Render Post-Care Instructions In Note?: no

## 2023-03-03 NOTE — ED PROVIDER NOTE - PHYSICAL EXAMINATION
CONSTITUTIONAL: In no apparent distress. Coughing violently during exam.   ENT: Airway patent, moist mucous membranes.   EYES: Pupils equal, round and reactive to light. EOMI. Conjunctiva normal appearing.   CARDIAC: Normal rate, regular rhythm.  Heart sounds S1, S2.    RESPIRATORY: Exp wheezing worse on the left with crackles. No tachypnea. Speaking in full sentences. O2 sat 96% on RA.   GASTROINTESTINAL: Abdomen soft, non-tender, not distended.  MUSCULOSKELETAL: Spine appears normal.  NEUROLOGICAL: Alert and oriented x3, no focal deficits, no motor or sensory deficits. 5/5 muscle strength throughout.  SKIN: Skin normal color, warm, dry and intact.   PSYCHIATRIC: Normal mood and affect.

## 2023-03-03 NOTE — ED PROVIDER NOTE - ATTENDING APP SHARED VISIT CONTRIBUTION OF CARE
Attending MD Serra:   I personally have seen and examined this patient.  Physician assistant note reviewed and agree on plan of care and except where noted.  Please see my MDM for further details.

## 2023-03-03 NOTE — ED ADULT NURSE NOTE - OBJECTIVE STATEMENT
PT is a 77 year old A&OX4 female with PMH of HTN, HLD, atrial fibrillation on Eliquis, and hypothyroidism who presents to the ED from home with c/o cough. PT states she has had a cough for 3 weeks that resolved for 1 week and returned again this week. PT states she saw her PCP, went to Urgent Care, and saw another pediatrician. PT was given antibiotics and steroids without relief. PT endorsing cough, headache, back pain, and rhinorrhea. PT denies chest pain, SOB, fevers at home, N/V/D, and dizziness. PT comes to the ED with concerns for pneumonia. PT is resting comfortably in bed, breathing unlabored on room air but coughing, and speaking in complete sentences. Abdomen is soft, non-tender, and non-distended. Skin is warm and dry, no diaphoresis noted. No edema noted to B/L extremities. Strong strength in B/L extremities, sensation intact. PT placed in hospital gown. PT ambulatory with steady gait. Safety and comfort maintained. Son at the bedside.

## 2023-03-04 NOTE — ED POST DISCHARGE NOTE - RESULT SUMMARY
Discussed case with PATEL Dobbs who reports patient was made aware of this result prior to discharge.  -Mane Ware PA-C

## 2023-03-04 NOTE — ED POST DISCHARGE NOTE - REASON FOR FOLLOW-UP
Other Problem: Discharge Planning  Goal: Discharge to home or other facility with appropriate resources  Outcome: Progressing  Note: Pt will be discharged to appropriate level of care. SW and medical team are following. Pt verbalizes understanding of discharge plans. Will monitor. Problem: Safety - Adult  Goal: Free from fall injury  Outcome: Progressing  Note: Fall risk assessment completed. Fall precautions in place. Call light within reach. Pt educated on calling for assistance before getting up. Walkway free of clutter. Will continue to monitor. +Hmpv

## 2023-03-12 ENCOUNTER — NON-APPOINTMENT (OUTPATIENT)
Age: 78
End: 2023-03-12

## 2023-03-13 ENCOUNTER — NON-APPOINTMENT (OUTPATIENT)
Age: 78
End: 2023-03-13

## 2023-03-15 ENCOUNTER — APPOINTMENT (OUTPATIENT)
Dept: PULMONOLOGY | Facility: CLINIC | Age: 78
End: 2023-03-15
Payer: MEDICARE

## 2023-03-15 VITALS
WEIGHT: 156 LBS | DIASTOLIC BLOOD PRESSURE: 60 MMHG | OXYGEN SATURATION: 98 % | SYSTOLIC BLOOD PRESSURE: 122 MMHG | HEART RATE: 82 BPM | HEIGHT: 66 IN | BODY MASS INDEX: 25.07 KG/M2 | RESPIRATION RATE: 16 BRPM

## 2023-03-15 DIAGNOSIS — Z77.22 CONTACT WITH AND (SUSPECTED) EXPOSURE TO ENVIRONMENTAL TOBACCO SMOKE (ACUTE) (CHRONIC): ICD-10-CM

## 2023-03-15 DIAGNOSIS — R05.3 CHRONIC COUGH: ICD-10-CM

## 2023-03-15 LAB — POCT - HEMOGLOBIN (HGB), QUANTITATIVE, TRANSCUTANEOUS: 12.9

## 2023-03-15 PROCEDURE — 94010 BREATHING CAPACITY TEST: CPT

## 2023-03-15 PROCEDURE — 88738 HGB QUANT TRANSCUTANEOUS: CPT

## 2023-03-15 PROCEDURE — 94729 DIFFUSING CAPACITY: CPT

## 2023-03-15 PROCEDURE — 94727 GAS DIL/WSHOT DETER LNG VOL: CPT

## 2023-03-15 PROCEDURE — ZZZZZ: CPT

## 2023-03-15 PROCEDURE — 99204 OFFICE O/P NEW MOD 45 MIN: CPT | Mod: 25

## 2023-03-15 NOTE — HISTORY OF PRESENT ILLNESS
[Never] : never [TextBox_4] : RICHELLE VALENZUELA is a 77 year old female who presents with her son for pulm eval\par recently moved from [place] to \par told she has asthma in [place]\par \par other history; HTN, Right Subclavian Artery Stenosis s/p stent,  hypothyroidism, afib\par \par \par cough X 1 month. went to [facility] ER 3/3- , RVP + for hmpv. sent home. xray done\par s/p zpak, doxy & augmentin\par finished prednisone\par \par cough better not resolved. sometimes wet\par denies GERD\par some drip\par \par not on any bronchodilatos on standing basis\par has albuterol\par has symbicort\par \par never smoker\par

## 2023-03-15 NOTE — PROCEDURE
[FreeTextEntry1] : xray [facility] 3/2023 reviewed\par \par labs [facility] ER reviewed\par \par normal rema volume & Dlco\par

## 2023-03-15 NOTE — ASSESSMENT
[FreeTextEntry1] : post viral cough- start symbicort 80 standing BID with mouth rinse\par post nasal drip meds\par hold on abx\par hold on predniosne\par supportive OTC care\par hold on hydromet\par \par f/u 4 weeks

## 2023-03-15 NOTE — PHYSICAL EXAM
[No Acute Distress] : no acute distress [Well Nourished] : well nourished [Well Developed] : well developed [No Resp Distress] : no resp distress [Clear to Auscultation Bilaterally] : clear to auscultation bilaterally [No Acc Muscle Use] : no acc muscle use [No Focal Deficits] : no focal deficits [Oriented x3] : oriented x3

## 2023-05-15 ENCOUNTER — APPOINTMENT (OUTPATIENT)
Dept: PULMONOLOGY | Facility: CLINIC | Age: 78
End: 2023-05-15

## 2023-12-31 NOTE — PATIENT PROFILE ADULT - DO YOU FEEL LIKE HURTING YOURSELF OR OTHERS?
Name: Catrachita Segal            Admitted: 12/5/2023     Significant event:  desaturation to the 40s     Pt w/ a hx of lung cancer and several recent intubations (11/12, 12/5). ICU team was called to bedside because of desaturation to the 40s due to machine issue on vapotherm. NRB was placed w/ improvement in oxygenation to 90%.     /67   Pulse (!) 110   Temp 97.5 °F (36.4 °C) (Oral)   Resp (!) 39   Ht 1.676 m (5' 5.98\")   Wt 51.2 kg (112 lb 14 oz)   SpO2 90%   BMI 18.23 kg/m²   General appearance: acute distress due to respiratory distress   Lungs: pt leaning forward in the chair, tachypneic   Heart: tachycardic  Neurologic: Mental status: alert, speaking in short sentences     A/P   - respiratory distress and hypoxia, resolved   - continue NRB   - Dr. Donaldson at bedside      Orders: nonrebreather    Code:DNR-CCA  Disposition: [facility]      Candy Jensen MD PGY-1    12/31/2023  10:25 AM   no

## 2024-09-25 ENCOUNTER — APPOINTMENT (OUTPATIENT)
Dept: MRI IMAGING | Facility: CLINIC | Age: 79
End: 2024-09-25